# Patient Record
Sex: MALE | Race: WHITE | NOT HISPANIC OR LATINO
[De-identification: names, ages, dates, MRNs, and addresses within clinical notes are randomized per-mention and may not be internally consistent; named-entity substitution may affect disease eponyms.]

---

## 2017-06-13 ENCOUNTER — APPOINTMENT (OUTPATIENT)
Dept: CARDIOLOGY | Facility: CLINIC | Age: 57
End: 2017-06-13

## 2017-06-13 DIAGNOSIS — Z78.9 OTHER SPECIFIED HEALTH STATUS: ICD-10-CM

## 2017-06-13 DIAGNOSIS — Z87.891 PERSONAL HISTORY OF NICOTINE DEPENDENCE: ICD-10-CM

## 2017-06-13 DIAGNOSIS — Z98.890 OTHER SPECIFIED POSTPROCEDURAL STATES: ICD-10-CM

## 2017-10-30 PROBLEM — Z98.890 H/O CARDIAC CATHETERIZATION: Status: RESOLVED | Noted: 2017-10-30 | Resolved: 2017-10-30

## 2017-10-30 PROBLEM — Z87.891 FORMER SMOKER: Status: ACTIVE | Noted: 2017-10-30

## 2017-10-30 PROBLEM — Z78.9 DRINKS BEER: Status: ACTIVE | Noted: 2017-10-30

## 2017-10-30 RX ORDER — ASPIRIN 81 MG
81 TABLET, DELAYED RELEASE (ENTERIC COATED) ORAL DAILY
Refills: 0 | Status: ACTIVE | COMMUNITY

## 2017-11-28 ENCOUNTER — MEDICATION RENEWAL (OUTPATIENT)
Age: 57
End: 2017-11-28

## 2018-01-02 ENCOUNTER — APPOINTMENT (OUTPATIENT)
Dept: CARDIOLOGY | Facility: CLINIC | Age: 58
End: 2018-01-02
Payer: COMMERCIAL

## 2018-01-02 ENCOUNTER — NON-APPOINTMENT (OUTPATIENT)
Age: 58
End: 2018-01-02

## 2018-01-02 VITALS
HEART RATE: 82 BPM | DIASTOLIC BLOOD PRESSURE: 72 MMHG | WEIGHT: 235 LBS | BODY MASS INDEX: 31.14 KG/M2 | HEIGHT: 73 IN | SYSTOLIC BLOOD PRESSURE: 150 MMHG | OXYGEN SATURATION: 94 %

## 2018-01-02 PROCEDURE — 99214 OFFICE O/P EST MOD 30 MIN: CPT

## 2018-01-22 ENCOUNTER — APPOINTMENT (OUTPATIENT)
Dept: CARDIOLOGY | Facility: CLINIC | Age: 58
End: 2018-01-22
Payer: COMMERCIAL

## 2018-01-22 PROCEDURE — 93880 EXTRACRANIAL BILAT STUDY: CPT

## 2018-01-22 PROCEDURE — 93306 TTE W/DOPPLER COMPLETE: CPT

## 2018-01-22 PROCEDURE — 93979 VASCULAR STUDY: CPT

## 2018-01-25 ENCOUNTER — APPOINTMENT (OUTPATIENT)
Dept: CARDIOLOGY | Facility: CLINIC | Age: 58
End: 2018-01-25

## 2018-02-28 ENCOUNTER — MEDICATION RENEWAL (OUTPATIENT)
Age: 58
End: 2018-02-28

## 2018-06-25 ENCOUNTER — RX RENEWAL (OUTPATIENT)
Age: 58
End: 2018-06-25

## 2018-07-09 ENCOUNTER — RX RENEWAL (OUTPATIENT)
Age: 58
End: 2018-07-09

## 2018-07-10 ENCOUNTER — APPOINTMENT (OUTPATIENT)
Dept: CARDIOLOGY | Facility: CLINIC | Age: 58
End: 2018-07-10
Payer: COMMERCIAL

## 2018-07-10 VITALS
WEIGHT: 236 LBS | BODY MASS INDEX: 31.28 KG/M2 | HEART RATE: 70 BPM | SYSTOLIC BLOOD PRESSURE: 136 MMHG | HEIGHT: 73 IN | DIASTOLIC BLOOD PRESSURE: 74 MMHG | OXYGEN SATURATION: 95 %

## 2018-07-10 PROCEDURE — 99214 OFFICE O/P EST MOD 30 MIN: CPT

## 2018-07-31 ENCOUNTER — RX RENEWAL (OUTPATIENT)
Age: 58
End: 2018-07-31

## 2018-07-31 ENCOUNTER — MEDICATION RENEWAL (OUTPATIENT)
Age: 58
End: 2018-07-31

## 2018-10-24 ENCOUNTER — MEDICATION RENEWAL (OUTPATIENT)
Age: 58
End: 2018-10-24

## 2019-01-10 ENCOUNTER — NON-APPOINTMENT (OUTPATIENT)
Age: 59
End: 2019-01-10

## 2019-01-10 ENCOUNTER — APPOINTMENT (OUTPATIENT)
Dept: CARDIOLOGY | Facility: CLINIC | Age: 59
End: 2019-01-10
Payer: COMMERCIAL

## 2019-01-10 VITALS
HEIGHT: 73 IN | WEIGHT: 238 LBS | SYSTOLIC BLOOD PRESSURE: 134 MMHG | OXYGEN SATURATION: 95 % | HEART RATE: 73 BPM | BODY MASS INDEX: 31.54 KG/M2 | DIASTOLIC BLOOD PRESSURE: 80 MMHG

## 2019-01-10 PROCEDURE — 93000 ELECTROCARDIOGRAM COMPLETE: CPT

## 2019-01-10 PROCEDURE — 99214 OFFICE O/P EST MOD 30 MIN: CPT

## 2019-01-10 NOTE — DISCUSSION/SUMMARY
[FreeTextEntry1] : Terrence is a 58-year-old male  with medical history detailed above and active medical issues including:\par \par -Stable angina, normal stress echo. normal LVEF March 2016\par \par -Coronary artery disease, unstable angina, non-Q MI, drug-eluting stent of the LAD in December 2013, stable on aspirin, Plavix, and Toprol, normalized LV systolic function and wall motion. Patient wishes to continue long-term aspirin and Plavix therapy. \par \par -Slight increase in aortic root dimensions on most recent echocardiogram. Patient reminded to avoid heavy weight lifting. \par \par  -Brief hemoptysis resolved, prior smoker, declines pulmonology follow-up \par \par -Dyslipidemia, on high intensity statin therapy, stable.\par \par -Tobacco addiction, quit in December 2013, and again 1/9/16 after hemoptysis admission [facility].\par \par Advised patient to follow active lifestyle with regular cardiovascular exercise. Patient educated on lifestyle and diet modification with low sodium low fat diet and avoidance of excessive alcohol. Patient is aware to call with any symptoms or concerns. \par \par Cardiology followup 6 months.Patient will have 2-D echocardiogram to assess for  LV systolic function, wall motion and  structural heart disease. Carotid and abdominal ultrasound to assess for obstructive PAD. \par \par  Current cardiac medications remain unchanged. Repeat labs will be ordered with PMD.\par \par Umer will follow up with Dr. Katie Barriga for primary care.\par

## 2019-01-10 NOTE — PHYSICAL EXAM
[General Appearance - Well Developed] : well developed [Normal Appearance] : normal appearance [Well Groomed] : well groomed [General Appearance - Well Nourished] : well nourished [No Deformities] : no deformities [General Appearance - In No Acute Distress] : no acute distress [Normal Jugular Venous A Waves Present] : normal jugular venous A waves present [Normal Jugular Venous V Waves Present] : normal jugular venous V waves present [No Jugular Venous Downing A Waves] : no jugular venous downing A waves [Respiration, Rhythm And Depth] : normal respiratory rhythm and effort [Exaggerated Use Of Accessory Muscles For Inspiration] : no accessory muscle use [Auscultation Breath Sounds / Voice Sounds] : lungs were clear to auscultation bilaterally [Abdomen Soft] : soft [Abdomen Tenderness] : non-tender [Abdomen Mass (___ Cm)] : no abdominal mass palpated [Abnormal Walk] : normal gait [Gait - Sufficient For Exercise Testing] : the gait was sufficient for exercise testing [Nail Clubbing] : no clubbing of the fingernails [Cyanosis, Localized] : no localized cyanosis [Petechial Hemorrhages (___cm)] : no petechial hemorrhages [Skin Color & Pigmentation] : normal skin color and pigmentation [] : no rash [No Venous Stasis] : no venous stasis [Skin Lesions] : no skin lesions [No Skin Ulcers] : no skin ulcer [No Xanthoma] : no  xanthoma was observed [Oriented To Time, Place, And Person] : oriented to person, place, and time [Affect] : the affect was normal [Mood] : the mood was normal [No Anxiety] : not feeling anxious

## 2019-01-10 NOTE — REASON FOR VISIT
[Follow-Up - Clinic] : a clinic follow-up of [FreeTextEntry2] : HTN, dyslipidemia, CAD, NSTEMI, RENETTA LAD Dec 2013 [FreeTextEntry1] : Terrence is a 58-year-old male with a history of dyslipidemia, hypertension, recent unstable angina, NSTEMI,  RENETTA LAD in December 2013, mild cardiomyopathy with normalization of LV, prior tobacco, quit in December 2013 and Jan 2016.\par \par Cardiovascular review of symptoms is negative for exertional chest pain, dyspnea, palpitations, dizziness or syncope. No PND or orthopnea leg edema. No bleeding or black stool.\par \par Patient is physically active walking more than 20 minutes without exertional chest pain.  Patient is clamming in the chest deep water wearing a wet suit several hours a day during the winter. Patient declines performing a current stress test. \par \par Terrence works out at the gym three times per week. He is working as a  at [employer]. \par \par Patient had admission to [facility] January 9, 2016 for coughing and hemoptysis.  Patient states that he had been violently coughing productive yellow sputum and subsequent small amount of blood.  He became quite anxious that he may have "lung cancer".  He was admitted to [facility] evaluated by pulmonologist Dr. Mitchell.  CT of the chest revealing scattered ground glass hazy nodular-type infiltrates probable left upper lobe infiltrate.  Study was not conclusive to rule out PE and VQ scan was performed.  Results not available to me, patient states negative for PE. Patient was treated for pneumonia.  Video imaging of the oropharynx was discussed patient declined.  \par \par Patient was discharged on oral antibiotic therapy.  He stopped smoking and vaping since discharge.  He restarted the Plavix no further hemoptysis  Drug-eluting stent LAD was placed December 2013.   Discussed risk and options of long term Plavix and he wishes to continue.  \par \par 2-D echo January 2018 LV of 60%, normal diastolic function, normal Doppler, aortic root 4.5cm,  ascending aorta 4.2cm. \par \par Carotid and abdominal ultrasound January 2018 unobstructive, normal abdominal aortic size.\par \par Stress echo March 2016 LVEF 60% normal in size wall motion no ischemia, equivocal EKG response, no anginal symptoms, hypertensive blood pressures response, 80% MPHR 9 minutes Huseyin protocol\par \par Echocardiogram 3/17/16. EF: 55%, mild LAE and LV E. No LVH. Normal systolic function. Mild diastolic dysfunction. Mildly dilated aortic root. Aortic arch measures 2.8 cm, ascending aorta measures 4.3 cm. Trace to mild valvulopathy.\par \par Carotid ultrasound 3/17/16. Bilateral intimal thickening. No change noted.\par \par Abdominal ultrasound 3/17/16. No abdominal aortic aneurysm noted. No prominent or mobile plaque noted. Stress echocardiogram 3/24/16. Completed nine minutes and nine seconds of Huseyin protocol achieving 88% of MPHR. Peak blood pressure was 194/100 mmHg off of his Toprol. Equivocal for ischemia by EKG. Normal augmentation of left ventricular function wall motion. Negative for exercise-induced myocardial ischemia.\par

## 2019-01-23 ENCOUNTER — MEDICATION RENEWAL (OUTPATIENT)
Age: 59
End: 2019-01-23

## 2019-02-25 ENCOUNTER — RX RENEWAL (OUTPATIENT)
Age: 59
End: 2019-02-25

## 2019-04-25 ENCOUNTER — MEDICATION RENEWAL (OUTPATIENT)
Age: 59
End: 2019-04-25

## 2019-04-25 ENCOUNTER — RX RENEWAL (OUTPATIENT)
Age: 59
End: 2019-04-25

## 2019-04-30 ENCOUNTER — RX RENEWAL (OUTPATIENT)
Age: 59
End: 2019-04-30

## 2019-07-01 ENCOUNTER — RX RENEWAL (OUTPATIENT)
Age: 59
End: 2019-07-01

## 2019-07-09 ENCOUNTER — RX RENEWAL (OUTPATIENT)
Age: 59
End: 2019-07-09

## 2019-07-23 ENCOUNTER — APPOINTMENT (OUTPATIENT)
Dept: CARDIOLOGY | Facility: CLINIC | Age: 59
End: 2019-07-23
Payer: COMMERCIAL

## 2019-07-23 VITALS
HEART RATE: 75 BPM | SYSTOLIC BLOOD PRESSURE: 130 MMHG | BODY MASS INDEX: 31.28 KG/M2 | WEIGHT: 236 LBS | DIASTOLIC BLOOD PRESSURE: 72 MMHG | OXYGEN SATURATION: 95 % | HEIGHT: 73 IN

## 2019-07-23 PROCEDURE — 99214 OFFICE O/P EST MOD 30 MIN: CPT

## 2019-07-23 NOTE — PHYSICAL EXAM
[Normal Appearance] : normal appearance [Well Groomed] : well groomed [General Appearance - Well Developed] : well developed [General Appearance - Well Nourished] : well nourished [No Deformities] : no deformities [Normal Jugular Venous A Waves Present] : normal jugular venous A waves present [General Appearance - In No Acute Distress] : no acute distress [Normal Jugular Venous V Waves Present] : normal jugular venous V waves present [No Jugular Venous Downing A Waves] : no jugular venous downing A waves [Exaggerated Use Of Accessory Muscles For Inspiration] : no accessory muscle use [Respiration, Rhythm And Depth] : normal respiratory rhythm and effort [Auscultation Breath Sounds / Voice Sounds] : lungs were clear to auscultation bilaterally [Abdomen Soft] : soft [Abdomen Tenderness] : non-tender [Abdomen Mass (___ Cm)] : no abdominal mass palpated [Abnormal Walk] : normal gait [Gait - Sufficient For Exercise Testing] : the gait was sufficient for exercise testing [Cyanosis, Localized] : no localized cyanosis [Nail Clubbing] : no clubbing of the fingernails [Petechial Hemorrhages (___cm)] : no petechial hemorrhages [Skin Color & Pigmentation] : normal skin color and pigmentation [No Venous Stasis] : no venous stasis [] : no rash [No Xanthoma] : no  xanthoma was observed [No Skin Ulcers] : no skin ulcer [Skin Lesions] : no skin lesions [Oriented To Time, Place, And Person] : oriented to person, place, and time [Affect] : the affect was normal [Mood] : the mood was normal [No Anxiety] : not feeling anxious

## 2020-01-23 ENCOUNTER — APPOINTMENT (OUTPATIENT)
Dept: CARDIOLOGY | Facility: CLINIC | Age: 60
End: 2020-01-23
Payer: COMMERCIAL

## 2020-01-23 ENCOUNTER — NON-APPOINTMENT (OUTPATIENT)
Age: 60
End: 2020-01-23

## 2020-01-23 VITALS
BODY MASS INDEX: 31.28 KG/M2 | WEIGHT: 236 LBS | HEART RATE: 77 BPM | OXYGEN SATURATION: 95 % | HEIGHT: 73 IN | DIASTOLIC BLOOD PRESSURE: 80 MMHG | SYSTOLIC BLOOD PRESSURE: 130 MMHG

## 2020-01-23 PROCEDURE — 93000 ELECTROCARDIOGRAM COMPLETE: CPT

## 2020-01-23 PROCEDURE — 99214 OFFICE O/P EST MOD 30 MIN: CPT

## 2020-01-23 NOTE — DISCUSSION/SUMMARY
[FreeTextEntry1] : Terrence is a 59 year-old male  with medical history detailed above and active medical issues including:\par \par -Stable angina, normal stress echo. normal LVEF March 2016. Patient declines performing a current stress test, will discuss next office visit.\par \par -Coronary artery disease, unstable angina, non-Q MI, drug-eluting stent of the LAD in December 2013, stable on aspirin, Plavix, and Toprol, normalized LV systolic function and wall motion. Patient wishes to continue long-term aspirin and Plavix therapy. \par \par - Ascending aortic aneurysm 4.2cm unchanged,  echo January 2018. Patient reminded to avoid heavy weight lifting. \par \par  -Brief hemoptysis resolved, prior smoker, declines pulmonology follow-up \par \par -Dyslipidemia, on high intensity statin therapy, stable.\par \par -Tobacco addiction, quit in December 2013, and again 1/9/16 after hemoptysis admission [facility].\par \par Advised patient to follow active lifestyle with regular cardiovascular exercise. Patient educated on lifestyle and diet modification with low sodium low fat diet and avoidance of excessive alcohol. Patient is aware to call with any symptoms or concerns. \par \par Cardiology followup 6 months.Patient will have 2-D echocardiogram to assess for  LV systolic function, wall motion and  structural heart disease.  Carotid and abdominal ultrasound to assess for obstructive PAD.\par \par Current cardiac medications remain unchanged. Repeat labs will be ordered with PMD.\par \par Umer will follow up with Dr. Katie Barriga for primary care.\par

## 2020-01-23 NOTE — REASON FOR VISIT
[Follow-Up - Clinic] : a clinic follow-up of [FreeTextEntry1] : Terrence is a 59-year-old male with a history of dyslipidemia, hypertension,  unstable angina, NSTEMI,  RENETTA LAD in December 2013, mild cardiomyopathy with normalization of LV, prior tobacco, quit in December 2013 and Jan 2016.\par \par Cardiovascular review of symptoms is negative for exertional chest pain, dyspnea, palpitations, dizziness or syncope. No PND or orthopnea leg edema. No bleeding or black stool.\par \par Patient is physically active walking more than 20 minutes without exertional chest pain.  Patient is clamming in the chest deep water wearing a wet suit several hours a day during the winter. Patient declines performing a current stress test. \par \par Terrence works out at the gym three times per week. He is working as a  at [employer]. \par \par Patient had admission to [facility] January 9, 2016 for coughing and hemoptysis.  Patient states that he had been violently coughing productive yellow sputum and subsequent small amount of blood.  He became quite anxious that he may have "lung cancer".  He was admitted to [facility] evaluated by pulmonologist Dr. Mitchell.  CT of the chest revealing scattered ground glass hazy nodular-type infiltrates probable left upper lobe infiltrate.  Study was not conclusive to rule out PE and VQ scan was performed.  Results not available to me, patient states negative for PE. Patient was treated for pneumonia.  Video imaging of the oropharynx was discussed patient declined.  \par \par Patient was discharged on oral antibiotic therapy.  He stopped smoking and vaping since discharge.  He restarted the Plavix no further hemoptysis  Drug-eluting stent LAD was placed December 2013.   Discussed risk and options of long term Plavix and he wishes to continue.  \par \par 2-D echo January 2018 LV of 60%, normal diastolic function, normal Doppler, aortic root 4.5cm,  ascending aorta 4.2cm. \par \par Carotid and abdominal ultrasound January 2018 unobstructive, normal abdominal aortic size.\par \par Stress echo March 2016 LVEF 60% normal in size wall motion no ischemia, equivocal EKG response, no anginal symptoms, hypertensive blood pressures response, 80% MPHR 9 minutes Huseyin protocol\par \par Echocardiogram 3/17/16. EF: 55%, mild LAE and LV E. No LVH. Normal systolic function. Mild diastolic dysfunction. Mildly dilated aortic root. Aortic arch measures 2.8 cm, ascending aorta measures 4.3 cm. Trace to mild valvulopathy.\par \par Carotid ultrasound 3/17/16. Bilateral intimal thickening. No change noted.\par \par Abdominal ultrasound 3/17/16. No abdominal aortic aneurysm noted. No prominent or mobile plaque noted. Stress echocardiogram 3/24/16. Completed nine minutes and nine seconds of Huseyin protocol achieving 88% of MPHR. Peak blood pressure was 194/100 mmHg off of his Toprol. Equivocal for ischemia by EKG. Normal augmentation of left ventricular function wall motion. Negative for exercise-induced myocardial ischemia.\par  [FreeTextEntry2] : HTN, dyslipidemia, CAD, NSTEMI, RENETTA LAD Dec 2013

## 2020-02-03 ENCOUNTER — APPOINTMENT (OUTPATIENT)
Dept: CARDIOLOGY | Facility: CLINIC | Age: 60
End: 2020-02-03
Payer: COMMERCIAL

## 2020-02-03 PROCEDURE — 93880 EXTRACRANIAL BILAT STUDY: CPT

## 2020-02-03 PROCEDURE — 93306 TTE W/DOPPLER COMPLETE: CPT

## 2020-02-03 PROCEDURE — 93979 VASCULAR STUDY: CPT

## 2020-09-16 ENCOUNTER — RX RENEWAL (OUTPATIENT)
Age: 60
End: 2020-09-16

## 2020-09-30 ENCOUNTER — APPOINTMENT (OUTPATIENT)
Dept: CARDIOLOGY | Facility: CLINIC | Age: 60
End: 2020-09-30
Payer: COMMERCIAL

## 2020-09-30 VITALS
OXYGEN SATURATION: 98 % | HEART RATE: 73 BPM | DIASTOLIC BLOOD PRESSURE: 76 MMHG | BODY MASS INDEX: 31.41 KG/M2 | TEMPERATURE: 97.5 F | SYSTOLIC BLOOD PRESSURE: 124 MMHG | WEIGHT: 237 LBS | HEIGHT: 73 IN

## 2020-09-30 PROCEDURE — 99214 OFFICE O/P EST MOD 30 MIN: CPT

## 2020-09-30 NOTE — REASON FOR VISIT
[Follow-Up - Clinic] : a clinic follow-up of [FreeTextEntry2] : HTN, dyslipidemia, CAD, NSTEMI, RENETTA LAD Dec 2013 [FreeTextEntry1] : Terrence is a 60-year-old male with a history of dyslipidemia, hypertension,  unstable angina, NSTEMI,  RENETTA LAD in December 2013, mild cardiomyopathy with normalization of LV, prior tobacco, quit in December 2013 and Jan 2016.\par \par Cardiovascular review of symptoms is negative for exertional chest pain, dyspnea, palpitations, dizziness or syncope. No PND or orthopnea leg edema. No bleeding or black stool.\par \par Patient is physically active walking more than 20 minutes without exertional chest pain.  Patient is clamming in the chest deep water wearing a wet suit several hours a day during the winter. Patient declines performing a current stress test. \par \par Terrence works out at the gym three times per week. He is working as a  at [employer]. \par \par Patient had admission to [facility] January 9, 2016 for coughing and hemoptysis.  Patient states that he had been violently coughing productive yellow sputum and subsequent small amount of blood.  He became quite anxious that he may have "lung cancer".  He was admitted to [facility] evaluated by pulmonologist Dr. Mitchell.  CT of the chest revealing scattered ground glass hazy nodular-type infiltrates probable left upper lobe infiltrate.  Study was not conclusive to rule out PE and VQ scan was performed.  Results not available to me, patient states negative for PE. Patient was treated for pneumonia.  Video imaging of the oropharynx was discussed patient declined.  \par \par Patient was discharged on oral antibiotic therapy.  He stopped smoking and vaping since discharge.  He restarted the Plavix no further hemoptysis  Drug-eluting stent LAD was placed December 2013.   Discussed risk and options of long term Plavix and he wishes to continue.  \par \par 2-D echo January 2018 LV of 60%, normal diastolic function, normal Doppler, aortic root 4.5cm,  ascending aorta 4.2cm. \par \par Carotid and abdominal ultrasound January 2018 unobstructive, normal abdominal aortic size.\par \par Stress echo March 2016 LVEF 60% normal in size wall motion no ischemia, equivocal EKG response, no anginal symptoms, hypertensive blood pressures response, 80% MPHR 9 minutes Huseyin protocol\par \par Echocardiogram 3/17/16. EF: 55%, mild LAE and LV E. No LVH. Normal systolic function. Mild diastolic dysfunction. Mildly dilated aortic root. Aortic arch measures 2.8 cm, ascending aorta measures 4.3 cm. Trace to mild valvulopathy.\par \par Carotid ultrasound 3/17/16. Bilateral intimal thickening. No change noted.\par \par Abdominal ultrasound 3/17/16. No abdominal aortic aneurysm noted. No prominent or mobile plaque noted. Stress echocardiogram 3/24/16. Completed nine minutes and nine seconds of Huseyin protocol achieving 88% of MPHR. Peak blood pressure was 194/100 mmHg off of his Toprol. Equivocal for ischemia by EKG. Normal augmentation of left ventricular function wall motion. Negative for exercise-induced myocardial ischemia.\par

## 2020-09-30 NOTE — DISCUSSION/SUMMARY
[FreeTextEntry1] : Terrence is a 59 year-old male  with medical history detailed above and active medical issues including:\par \par -Stable angina, normal stress echo. normal LVEF March 2016. Patient declines performing a current stress test in past.  In the setting of Covid 19 pandemic we will discuss the need for stress testing next office visit.\par \par - Coronary artery disease, unstable angina, non-Q MI, drug-eluting stent of the LAD in December 2013, stable on aspirin, Plavix, and Toprol, normalized LV systolic function and wall motion. Patient wishes to continue long-term aspirin and Plavix therapy. \par \par - Ascending aortic aneurysm 4.2cm unchanged,  echo January 2018. Patient reminded to avoid heavy weight lifting. \par \par - Brief hemoptysis resolved, prior smoker, declines pulmonology follow-up \par \par - Dyslipidemia, on high intensity statin therapy, stable.\par \par - Tobacco addiction, quit in December 2013, and again 1/9/16 after hemoptysis admission [facility].\par \par Advised patient to follow active lifestyle with regular cardiovascular exercise. Patient educated on lifestyle and diet modification with low sodium low fat diet and avoidance of excessive alcohol. Patient is aware to call with any symptoms or concerns. \par \par Cardiology followup 6 months.Patient will have 2-D echocardiogram to assess for  LV systolic function, wall motion and  structural heart disease. \par \par Current cardiac medications remain unchanged. Repeat labs will be ordered with PMD.\par \par Umer will follow up with Dr. Katie Barriga for primary care.\par

## 2020-10-28 ENCOUNTER — NON-APPOINTMENT (OUTPATIENT)
Age: 60
End: 2020-10-28

## 2020-12-03 DIAGNOSIS — Z00.00 ENCOUNTER FOR GENERAL ADULT MEDICAL EXAMINATION W/OUT ABNORMAL FINDINGS: ICD-10-CM

## 2021-01-20 NOTE — REASON FOR VISIT
Impression: Presence of intraocular lens: Z96.1.  Plan: Observe [Follow-Up - Clinic] : a clinic follow-up of [FreeTextEntry1] : Terrence is a 59-year-old male with a history of dyslipidemia, hypertension, recent unstable angina, NSTEMI,  RENETTA LAD in December 2013, mild cardiomyopathy with normalization of LV, prior tobacco, quit in December 2013 and Jan 2016.\par \par Cardiovascular review of symptoms is negative for exertional chest pain, dyspnea, palpitations, dizziness or syncope. No PND or orthopnea leg edema. No bleeding or black stool.\par \par Patient is physically active walking more than 20 minutes without exertional chest pain.  Patient is clamming in the chest deep water wearing a wet suit several hours a day during the winter. Patient declines performing a current stress test. \par \par Terrence works out at the gym three times per week. He is working as a  at [employer]. \par \par Patient had admission to [facility] January 9, 2016 for coughing and hemoptysis.  Patient states that he had been violently coughing productive yellow sputum and subsequent small amount of blood.  He became quite anxious that he may have "lung cancer".  He was admitted to [facility] evaluated by pulmonologist Dr. Mitchell.  CT of the chest revealing scattered ground glass hazy nodular-type infiltrates probable left upper lobe infiltrate.  Study was not conclusive to rule out PE and VQ scan was performed.  Results not available to me, patient states negative for PE. Patient was treated for pneumonia.  Video imaging of the oropharynx was discussed patient declined.  \par \par Patient was discharged on oral antibiotic therapy.  He stopped smoking and vaping since discharge.  He restarted the Plavix no further hemoptysis  Drug-eluting stent LAD was placed December 2013.   Discussed risk and options of long term Plavix and he wishes to continue.  \par \par 2-D echo January 2018 LV of 60%, normal diastolic function, normal Doppler, aortic root 4.5cm,  ascending aorta 4.2cm. \par \par Carotid and abdominal ultrasound January 2018 unobstructive, normal abdominal aortic size.\par \par Stress echo March 2016 LVEF 60% normal in size wall motion no ischemia, equivocal EKG response, no anginal symptoms, hypertensive blood pressures response, 80% MPHR 9 minutes Huseyin protocol\par \par Echocardiogram 3/17/16. EF: 55%, mild LAE and LV E. No LVH. Normal systolic function. Mild diastolic dysfunction. Mildly dilated aortic root. Aortic arch measures 2.8 cm, ascending aorta measures 4.3 cm. Trace to mild valvulopathy.\par \par Carotid ultrasound 3/17/16. Bilateral intimal thickening. No change noted.\par \par Abdominal ultrasound 3/17/16. No abdominal aortic aneurysm noted. No prominent or mobile plaque noted. Stress echocardiogram 3/24/16. Completed nine minutes and nine seconds of Huseyin protocol achieving 88% of MPHR. Peak blood pressure was 194/100 mmHg off of his Toprol. Equivocal for ischemia by EKG. Normal augmentation of left ventricular function wall motion. Negative for exercise-induced myocardial ischemia.\par  [FreeTextEntry2] : HTN, dyslipidemia, CAD, NSTEMI, RENETTA LAD Dec 2013

## 2021-03-16 ENCOUNTER — APPOINTMENT (OUTPATIENT)
Dept: CARDIOLOGY | Facility: CLINIC | Age: 61
End: 2021-03-16
Payer: COMMERCIAL

## 2021-03-16 PROCEDURE — 99072 ADDL SUPL MATRL&STAF TM PHE: CPT

## 2021-03-16 PROCEDURE — 93306 TTE W/DOPPLER COMPLETE: CPT

## 2021-04-27 ENCOUNTER — NON-APPOINTMENT (OUTPATIENT)
Age: 61
End: 2021-04-27

## 2021-04-27 ENCOUNTER — APPOINTMENT (OUTPATIENT)
Dept: CARDIOLOGY | Facility: CLINIC | Age: 61
End: 2021-04-27
Payer: COMMERCIAL

## 2021-04-27 VITALS
OXYGEN SATURATION: 97 % | WEIGHT: 235 LBS | HEIGHT: 73 IN | HEART RATE: 79 BPM | SYSTOLIC BLOOD PRESSURE: 130 MMHG | TEMPERATURE: 98.2 F | DIASTOLIC BLOOD PRESSURE: 70 MMHG | BODY MASS INDEX: 31.14 KG/M2

## 2021-04-27 PROCEDURE — 99072 ADDL SUPL MATRL&STAF TM PHE: CPT

## 2021-04-27 PROCEDURE — 93000 ELECTROCARDIOGRAM COMPLETE: CPT

## 2021-04-27 PROCEDURE — 99214 OFFICE O/P EST MOD 30 MIN: CPT

## 2021-04-27 NOTE — PHYSICAL EXAM
[General Appearance - Well Developed] : well developed [Normal Appearance] : normal appearance [Well Groomed] : well groomed [General Appearance - Well Nourished] : well nourished [No Deformities] : no deformities [General Appearance - In No Acute Distress] : no acute distress [Normal Jugular Venous A Waves Present] : normal jugular venous A waves present [Normal Jugular Venous V Waves Present] : normal jugular venous V waves present [No Jugular Venous Downing A Waves] : no jugular venous downing A waves [Respiration, Rhythm And Depth] : normal respiratory rhythm and effort [Exaggerated Use Of Accessory Muscles For Inspiration] : no accessory muscle use [Auscultation Breath Sounds / Voice Sounds] : lungs were clear to auscultation bilaterally [Abdomen Tenderness] : non-tender [Abdomen Soft] : soft [Abdomen Mass (___ Cm)] : no abdominal mass palpated [Abnormal Walk] : normal gait [Gait - Sufficient For Exercise Testing] : the gait was sufficient for exercise testing [Nail Clubbing] : no clubbing of the fingernails [Cyanosis, Localized] : no localized cyanosis [Petechial Hemorrhages (___cm)] : no petechial hemorrhages [Skin Color & Pigmentation] : normal skin color and pigmentation [] : no rash [No Venous Stasis] : no venous stasis [Skin Lesions] : no skin lesions [No Skin Ulcers] : no skin ulcer [No Xanthoma] : no  xanthoma was observed [Oriented To Time, Place, And Person] : oriented to person, place, and time [Affect] : the affect was normal [Mood] : the mood was normal [No Anxiety] : not feeling anxious

## 2021-04-27 NOTE — DISCUSSION/SUMMARY
[FreeTextEntry1] : Terrence is a 61 year-old male  with medical history detailed above and active medical issues including:\par \par - Patient has dyspnea with moderate exertion.  Stable angina, normal stress echo. normal LVEF March 2016.  Patient will have noninvasive testing with a exercise Myoview stress test to assess for obstructive CAD, exercise-induced arrhythmia,  blood pressure response.\par \par - Coronary artery disease, unstable angina, non-Q MI, drug-eluting stent of the LAD in December 2013, stable on aspirin, Plavix, and Toprol, normalized LV systolic function and wall motion. Patient wishes to continue long-term aspirin and Plavix therapy. \par \par - Ascending aortic aneurysm 4.2cm unchanged,  echo January 2018. Patient reminded to avoid heavy weight lifting. \par \par - Brief hemoptysis resolved, prior smoker, declines pulmonology follow-up \par \par - Dyslipidemia, on high intensity statin therapy, stable.\par \par - Tobacco addiction, quit in December 2013, and again 1/9/16 after hemoptysis admission [facility].\par \par Advised patient to follow active lifestyle with regular cardiovascular exercise. Patient educated on lifestyle and diet modification with low sodium low fat diet and avoidance of excessive alcohol. Patient is aware to call with any symptoms or concerns. \par \par Patient will be seen in cardiology follow-up after noninvasive testing. \par \par Current cardiac medications remain unchanged. Repeat labs will be ordered with PMD.\par \par Umer will follow up with Dr. Katie Barriga for primary care.\par

## 2021-04-27 NOTE — REASON FOR VISIT
[Follow-Up - Clinic] : a clinic follow-up of [FreeTextEntry1] : Terrence is a 61-year-old male with a history of dyslipidemia, hypertension,  angina, NSTEMI,  RENETTA LAD Dec 2013, mild cardiomyopathy with normalization of LVEF 60% echo March 2021, prior tobacco, quit in December 2013 and Jan 2016.\par \par Patient has dyspnea with moderate exertion.  Cardiovascular review of symptoms is negative for exertional chest pain, palpitations, dizziness or syncope. No PND or orthopnea leg edema. No bleeding or black stool.\par \par Patient is physically active walking more than 20 minutes without exertional chest pain.  Patient is clamming in the chest deep water wearing a wet suit several hours a day during the winter. Patient's brother had sudden death with prior DVT after horse kicked his leg.\par \par Patient no longer working out in the gym in the setting of Covid panndemic. He is working as a  at [employer]. \par \par Patient had admission to [facility] January 9, 2016 for coughing and hemoptysis.  Patient states that he had been violently coughing productive yellow sputum and subsequent small amount of blood.  He became quite anxious that he may have "lung cancer".  He was admitted to [facility] evaluated by pulmonologist Dr. Mitchell.  CT of the chest revealing scattered ground glass hazy nodular-type infiltrates probable left upper lobe infiltrate.  Study was not conclusive to rule out PE and VQ scan was performed.  Results not available to me, patient states negative for PE. Patient was treated for pneumonia.  Video imaging of the oropharynx was discussed patient declined.  \par \par Patient was discharged on oral antibiotic therapy.  He stopped smoking and vaping since discharge.  He restarted the Plavix no further hemoptysis  Drug-eluting stent LAD was placed December 2013.   Discussed risk and options of long term Plavix and he wishes to continue.  \par \par 2-D echo January 2018 LV of 60%, normal diastolic function, normal Doppler, aortic root 4.5cm,  ascending aorta 4.2cm. \par \par Carotid and abdominal ultrasound January 2018 unobstructive, normal abdominal aortic size.\par \par Stress echo March 2016 LVEF 60% normal in size wall motion no ischemia, equivocal EKG response, no anginal symptoms, hypertensive blood pressures response, 80% MPHR 9 minutes Huseyin protocol\par \par Echocardiogram 3/17/16. EF: 55%, mild LAE and LV E. No LVH. Normal systolic function. Mild diastolic dysfunction. Mildly dilated aortic root. Aortic arch measures 2.8 cm, ascending aorta measures 4.3 cm. Trace to mild valvulopathy.\par \par Carotid ultrasound 3/17/16. Bilateral intimal thickening. No change noted.\par \par Abdominal ultrasound 3/17/16. No abdominal aortic aneurysm noted. No prominent or mobile plaque noted. Stress echocardiogram 3/24/16. Completed nine minutes and nine seconds of Huseyin protocol achieving 88% of MPHR. Peak blood pressure was 194/100 mmHg off of his Toprol. Equivocal for ischemia by EKG. Normal augmentation of left ventricular function wall motion. Negative for exercise-induced myocardial ischemia.\par  [FreeTextEntry2] : HTN, dyslipidemia, CAD, NSTEMI, RENETTA LAD Dec 2013

## 2021-06-01 ENCOUNTER — APPOINTMENT (OUTPATIENT)
Dept: CARDIOLOGY | Facility: CLINIC | Age: 61
End: 2021-06-01
Payer: COMMERCIAL

## 2021-06-01 PROCEDURE — 93015 CV STRESS TEST SUPVJ I&R: CPT

## 2021-06-01 PROCEDURE — 78452 HT MUSCLE IMAGE SPECT MULT: CPT

## 2021-06-01 PROCEDURE — A9502: CPT

## 2021-06-01 PROCEDURE — 99072 ADDL SUPL MATRL&STAF TM PHE: CPT

## 2021-06-06 NOTE — DISCUSSION/SUMMARY
[FreeTextEntry1] : Terrence is a 59 year-old male  with medical history detailed above and active medical issues including:\par \par -Stable angina, normal stress echo. normal LVEF March 2016. Patient declines performing a current stress test, will discuss next office visit.\par \par -Coronary artery disease, unstable angina, non-Q MI, drug-eluting stent of the LAD in December 2013, stable on aspirin, Plavix, and Toprol, normalized LV systolic function and wall motion. Patient wishes to continue long-term aspirin and Plavix therapy. \par \par - Ascending aortic aneurysm 4.2cm unchanged,  echo January 2018. Patient reminded to avoid heavy weight lifting. \par \par  -Brief hemoptysis resolved, prior smoker, declines pulmonology follow-up \par \par -Dyslipidemia, on high intensity statin therapy, stable.\par \par -Tobacco addiction, quit in December 2013, and again 1/9/16 after hemoptysis admission [facility].\par \par Advised patient to follow active lifestyle with regular cardiovascular exercise. Patient educated on lifestyle and diet modification with low sodium low fat diet and avoidance of excessive alcohol. Patient is aware to call with any symptoms or concerns. \par \par Cardiology followup 6 months.Patient will have 2-D echocardiogram to assess for  LV systolic function, wall motion and  structural heart disease.\par \par  Current cardiac medications remain unchanged. Repeat labs will be ordered with PMD.\par \par Umer will follow up with Dr. Katie Barriga for primary care.\par 
Two to four times a month

## 2021-07-27 ENCOUNTER — NON-APPOINTMENT (OUTPATIENT)
Age: 61
End: 2021-07-27

## 2021-07-27 ENCOUNTER — APPOINTMENT (OUTPATIENT)
Dept: CARDIOLOGY | Facility: CLINIC | Age: 61
End: 2021-07-27
Payer: COMMERCIAL

## 2021-07-27 VITALS
DIASTOLIC BLOOD PRESSURE: 70 MMHG | WEIGHT: 232 LBS | TEMPERATURE: 97.3 F | HEIGHT: 73 IN | HEART RATE: 78 BPM | BODY MASS INDEX: 30.75 KG/M2 | OXYGEN SATURATION: 95 % | SYSTOLIC BLOOD PRESSURE: 120 MMHG

## 2021-07-27 PROCEDURE — 99072 ADDL SUPL MATRL&STAF TM PHE: CPT

## 2021-07-27 PROCEDURE — 99214 OFFICE O/P EST MOD 30 MIN: CPT

## 2021-07-27 NOTE — PHYSICAL EXAM
[General Appearance - Well Developed] : well developed [Normal Appearance] : normal appearance [Well Groomed] : well groomed [General Appearance - Well Nourished] : well nourished [No Deformities] : no deformities [General Appearance - In No Acute Distress] : no acute distress [Normal Jugular Venous A Waves Present] : normal jugular venous A waves present [Normal Jugular Venous V Waves Present] : normal jugular venous V waves present [No Jugular Venous Downing A Waves] : no jugular venous downing A waves [Respiration, Rhythm And Depth] : normal respiratory rhythm and effort [Exaggerated Use Of Accessory Muscles For Inspiration] : no accessory muscle use [Auscultation Breath Sounds / Voice Sounds] : lungs were clear to auscultation bilaterally [Abdomen Soft] : soft [Abdomen Tenderness] : non-tender [Abdomen Mass (___ Cm)] : no abdominal mass palpated [Abnormal Walk] : normal gait [Gait - Sufficient For Exercise Testing] : the gait was sufficient for exercise testing [Nail Clubbing] : no clubbing of the fingernails [Cyanosis, Localized] : no localized cyanosis [Petechial Hemorrhages (___cm)] : no petechial hemorrhages [Skin Color & Pigmentation] : normal skin color and pigmentation [] : no rash [No Venous Stasis] : no venous stasis [Skin Lesions] : no skin lesions [No Skin Ulcers] : no skin ulcer [No Xanthoma] : no  xanthoma was observed [Oriented To Time, Place, And Person] : oriented to person, place, and time [Affect] : the affect was normal [Mood] : the mood was normal [No Anxiety] : not feeling anxious [Well Developed] : well developed [Well Nourished] : well nourished [No Acute Distress] : no acute distress [Normal Conjunctiva] : normal conjunctiva [Normal Venous Pressure] : normal venous pressure [No Carotid Bruit] : no carotid bruit [Normal S1, S2] : normal S1, S2 [No Rub] : no rub [No Gallop] : no gallop [Clear Lung Fields] : clear lung fields [Good Air Entry] : good air entry [No Respiratory Distress] : no respiratory distress  [Soft] : abdomen soft [Non Tender] : non-tender [No Masses/organomegaly] : no masses/organomegaly [Normal Bowel Sounds] : normal bowel sounds [Normal Gait] : normal gait [No Edema] : no edema [No Cyanosis] : no cyanosis [No Clubbing] : no clubbing [No Varicosities] : no varicosities [No Rash] : no rash [No Skin Lesions] : no skin lesions [Moves all extremities] : moves all extremities [No Focal Deficits] : no focal deficits [Normal Speech] : normal speech [Alert and Oriented] : alert and oriented [Normal memory] : normal memory [de-identified] : 2/6 KRISTY MR

## 2021-07-27 NOTE — REASON FOR VISIT
[Other: ____] : [unfilled] [FreeTextEntry1] : Terrence is a 61-year-old male with a history of dyslipidemia, hypertension,  angina, NSTEMI,  RENETTA LAD Dec 2013, mild cardiomyopathy with normalization of LVEF 60% echo March 2021, prior tobacco, quit in December 2013 and Jan 2016.\par \par Cardiovascular review of symptoms is negative for exertional chest pain, dyspnea, palpitations, dizziness or syncope. No PND or orthopnea leg edema. No bleeding or black stool.\par \par Patient is physically active walking more than 20 minutes without exertional chest pain.  Patient is clamming in the chest deep water wearing a wet suit several hours a day during the winter. Patient's brother had sudden death with prior DVT after horse kicked his leg.\par \par Patient no longer working out in the gym in the setting of Covid panndemic. He is working as a  at [employer]. \par \par Patient had admission to [facility] January 9, 2016 for coughing and hemoptysis.  Patient states that he had been violently coughing productive yellow sputum and subsequent small amount of blood.  He became quite anxious that he may have "lung cancer".  He was admitted to [facility] evaluated by pulmonologist Dr. Mitchell.  CT of the chest revealing scattered ground glass hazy nodular-type infiltrates probable left upper lobe infiltrate.  Study was not conclusive to rule out PE and VQ scan was performed.  Results not available to me, patient states negative for PE. Patient was treated for pneumonia.  Video imaging of the oropharynx was discussed patient declined.  \par \par Patient was discharged on oral antibiotic therapy.  He stopped smoking and vaping since discharge.  He restarted the Plavix no further hemoptysis  Drug-eluting stent LAD was placed December 2013.   Discussed risk and options of long term Plavix and he wishes to continue.  \par \par Exercise Myoview stress test June 2021, LVEF 57%, normal perfusion, diaphragm attenuation seen, nonischemic submaximal EKG response, hypertensive blood pressure response, no chest pain, baseline sinus rhythm\par \par Echocardiogram March 2021, LVEF 60%, severe eccentric MR, mild TR, normal RVSP, ascending aorta 4.3 cm\par \par 2-D echo January 2018 LV of 60%, normal diastolic function, normal Doppler, aortic root 4.5cm,  ascending aorta 4.2cm. \par \par Carotid and abdominal ultrasound January 2018 unobstructive, normal abdominal aortic size.\par \par Stress echo March 2016 LVEF 60% normal in size wall motion no ischemia, equivocal EKG response, no anginal symptoms, hypertensive blood pressures response, 80% MPHR 9 minutes Huseyin protocol\par \par Echocardiogram 3/17/16. EF: 55%, mild LAE and LV E. No LVH. Normal systolic function. Mild diastolic dysfunction. Mildly dilated aortic root. Aortic arch measures 2.8 cm, ascending aorta measures 4.3 cm. Trace to mild valvulopathy.\par \par Carotid ultrasound 3/17/16. Bilateral intimal thickening. No change noted.\par \par Abdominal ultrasound 3/17/16. No abdominal aortic aneurysm noted. No prominent or mobile plaque noted. Stress echocardiogram 3/24/16. Completed nine minutes and nine seconds of Huseyin protocol achieving 88% of MPHR. Peak blood pressure was 194/100 mmHg off of his Toprol. Equivocal for ischemia by EKG. Normal augmentation of left ventricular function wall motion. Negative for exercise-induced myocardial ischemia.\par

## 2021-07-27 NOTE — DISCUSSION/SUMMARY
[FreeTextEntry1] : Terrence is a 61 year-old male  with medical history detailed above and active medical issues including:\par \par - Chronic stable angina, normal Myoview perfusion with normal LVEF June 2021.\par \par - Moderate severe eccentric MR, normal LVEF echo March 2021.  Scheduled for 6 months repeat echo limited for MR and LVEF.  Patient declined transesophageal echocardiogram. \par \par - Coronary artery disease, unstable angina, non-Q MI, drug-eluting stent of the LAD in December 2013, stable on aspirin, Plavix, and Toprol, normalized LV systolic function and wall motion. Patient wishes to continue long-term aspirin and Plavix therapy. \par \par - Ascending aortic aneurysm 4.3cm unchanged,  echo March 2021. Patient reminded to avoid heavy weight lifting. \par \par - History of brief hemoptysis resolved, prior smoker, declines pulmonology follow-up, continue annual chest x-ray\par \par - Dyslipidemia, on high intensity statin therapy, stable.\par \par - Tobacco addiction, quit in December 2013, and again 1/9/16 after hemoptysis admission [facility].\par \par Advised patient to follow active lifestyle with regular cardiovascular exercise. Patient educated on lifestyle and diet modification with low sodium low fat diet and avoidance of excessive alcohol. Patient is aware to call with any symptoms or concerns. \par \par Patient will be seen in cardiology follow-up after noninvasive testing. \par \par Current cardiac medications remain unchanged. Repeat labs will be ordered with PMD.\par \par Umer will follow up with Dr. Katie Barriga for primary care.\par

## 2021-08-10 ENCOUNTER — APPOINTMENT (OUTPATIENT)
Dept: CARDIOLOGY | Facility: CLINIC | Age: 61
End: 2021-08-10

## 2021-09-14 ENCOUNTER — NON-APPOINTMENT (OUTPATIENT)
Age: 61
End: 2021-09-14

## 2021-09-16 ENCOUNTER — APPOINTMENT (OUTPATIENT)
Dept: CARDIOLOGY | Facility: CLINIC | Age: 61
End: 2021-09-16

## 2021-10-11 ENCOUNTER — APPOINTMENT (OUTPATIENT)
Dept: CARDIOLOGY | Facility: CLINIC | Age: 61
End: 2021-10-11

## 2021-11-18 ENCOUNTER — APPOINTMENT (OUTPATIENT)
Dept: CARDIOLOGY | Facility: CLINIC | Age: 61
End: 2021-11-18
Payer: COMMERCIAL

## 2021-11-18 VITALS
HEIGHT: 73 IN | HEART RATE: 66 BPM | SYSTOLIC BLOOD PRESSURE: 142 MMHG | OXYGEN SATURATION: 96 % | TEMPERATURE: 97.4 F | WEIGHT: 230 LBS | DIASTOLIC BLOOD PRESSURE: 70 MMHG | BODY MASS INDEX: 30.48 KG/M2

## 2021-11-18 PROCEDURE — 99214 OFFICE O/P EST MOD 30 MIN: CPT

## 2021-11-18 RX ORDER — HYDROCODONE BITARTRATE AND ACETAMINOPHEN 5; 325 MG/1; MG/1
5-325 TABLET ORAL
Qty: 20 | Refills: 0 | Status: DISCONTINUED | COMMUNITY
Start: 2021-03-19 | End: 2021-11-18

## 2021-11-18 NOTE — REASON FOR VISIT
[Other: ____] : [unfilled] [FreeTextEntry1] : Terrence is a 61-year-old male with a history of dyslipidemia, hypertension,  angina, NSTEMI,  RENETTA LAD Dec 2013, mild cardiomyopathy with normalization of LVEF 60% echo March 2021, prior tobacco, quit in December 2013 and Jan 2016.\par \par Cardiovascular review of symptoms is negative for exertional chest pain, dyspnea, palpitations, dizziness or syncope. No PND or orthopnea leg edema. No bleeding or black stool.\par \par Patient is physically active walking more than 20 minutes without exertional chest pain.  Patient is clamming in the chest deep water wearing a wet suit several hours a day during the winter. Patient's brother had sudden death with prior DVT after horse kicked his leg.\par \par Patient no longer working out in the gym in the setting of Covid panndemic. He is working as a  at [employer]. \par \par Patient had admission to [facility] January 9, 2016 for coughing and hemoptysis.  Patient states that he had been violently coughing productive yellow sputum and subsequent small amount of blood.  He became quite anxious that he may have "lung cancer".  He was admitted to [facility] evaluated by pulmonologist Dr. Mitchell.  CT of the chest revealing scattered ground glass hazy nodular-type infiltrates probable left upper lobe infiltrate.  Study was not conclusive to rule out PE and VQ scan was performed.  Results not available to me, patient states negative for PE. Patient was treated for pneumonia.  Video imaging of the oropharynx was discussed patient declined.  \par \par Patient was discharged on oral antibiotic therapy.  He stopped smoking and vaping since discharge.  He restarted the Plavix no further hemoptysis  Drug-eluting stent LAD was placed December 2013.   Discussed risk and options of long term Plavix and he wishes to continue.  \par \par Exercise Myoview stress test June 2021, LVEF 57%, normal perfusion, diaphragm attenuation seen, nonischemic submaximal EKG response, hypertensive blood pressure response, no chest pain, baseline sinus rhythm\par \par Echocardiogram March 2021, LVEF 60%, severe eccentric MR, mild TR, normal RVSP, ascending aorta 4.3 cm\par \par 2-D echo January 2018 LV of 60%, normal diastolic function, normal Doppler, aortic root 4.5cm,  ascending aorta 4.2cm. \par \par Carotid and abdominal ultrasound January 2018 unobstructive, normal abdominal aortic size.\par \par Stress echo March 2016 LVEF 60% normal in size wall motion no ischemia, equivocal EKG response, no anginal symptoms, hypertensive blood pressures response, 80% MPHR 9 minutes Huseyin protocol\par \par Echocardiogram 3/17/16. EF: 55%, mild LAE and LV E. No LVH. Normal systolic function. Mild diastolic dysfunction. Mildly dilated aortic root. Aortic arch measures 2.8 cm, ascending aorta measures 4.3 cm. Trace to mild valvulopathy.\par \par Carotid ultrasound 3/17/16. Bilateral intimal thickening. No change noted.\par \par Abdominal ultrasound 3/17/16. No abdominal aortic aneurysm noted. No prominent or mobile plaque noted. Stress echocardiogram 3/24/16. Completed nine minutes and nine seconds of Huseyin protocol achieving 88% of MPHR. Peak blood pressure was 194/100 mmHg off of his Toprol. Equivocal for ischemia by EKG. Normal augmentation of left ventricular function wall motion. Negative for exercise-induced myocardial ischemia.\par

## 2021-11-18 NOTE — PHYSICAL EXAM
[Well Developed] : well developed [Well Nourished] : well nourished [No Acute Distress] : no acute distress [Normal Conjunctiva] : normal conjunctiva [Normal Venous Pressure] : normal venous pressure [No Carotid Bruit] : no carotid bruit [Normal S1, S2] : normal S1, S2 [No Rub] : no rub [No Gallop] : no gallop [Clear Lung Fields] : clear lung fields [Good Air Entry] : good air entry [No Respiratory Distress] : no respiratory distress  [Soft] : abdomen soft [Non Tender] : non-tender [No Masses/organomegaly] : no masses/organomegaly [Normal Bowel Sounds] : normal bowel sounds [Normal Gait] : normal gait [No Edema] : no edema [No Cyanosis] : no cyanosis [No Clubbing] : no clubbing [No Varicosities] : no varicosities [No Rash] : no rash [No Skin Lesions] : no skin lesions [Moves all extremities] : moves all extremities [No Focal Deficits] : no focal deficits [Normal Speech] : normal speech [Alert and Oriented] : alert and oriented [Normal memory] : normal memory [General Appearance - Well Developed] : well developed [Normal Appearance] : normal appearance [Well Groomed] : well groomed [General Appearance - Well Nourished] : well nourished [No Deformities] : no deformities [General Appearance - In No Acute Distress] : no acute distress [Normal Jugular Venous A Waves Present] : normal jugular venous A waves present [Normal Jugular Venous V Waves Present] : normal jugular venous V waves present [No Jugular Venous Downing A Waves] : no jugular venous downing A waves [Respiration, Rhythm And Depth] : normal respiratory rhythm and effort [Exaggerated Use Of Accessory Muscles For Inspiration] : no accessory muscle use [Auscultation Breath Sounds / Voice Sounds] : lungs were clear to auscultation bilaterally [Abdomen Soft] : soft [Abdomen Tenderness] : non-tender [Abdomen Mass (___ Cm)] : no abdominal mass palpated [Abnormal Walk] : normal gait [Gait - Sufficient For Exercise Testing] : the gait was sufficient for exercise testing [Nail Clubbing] : no clubbing of the fingernails [Cyanosis, Localized] : no localized cyanosis [Petechial Hemorrhages (___cm)] : no petechial hemorrhages [Skin Color & Pigmentation] : normal skin color and pigmentation [] : no rash [No Venous Stasis] : no venous stasis [No Skin Ulcers] : no skin ulcer [Skin Lesions] : no skin lesions [No Xanthoma] : no  xanthoma was observed [Oriented To Time, Place, And Person] : oriented to person, place, and time [Affect] : the affect was normal [Mood] : the mood was normal [No Anxiety] : not feeling anxious [de-identified] : 2/6 KRISTY MR

## 2021-11-18 NOTE — REASON FOR VISIT
[Other: ____] : [unfilled] [FreeTextEntry1] : Terrence is a 61-year-old male with a history of dyslipidemia, hypertension,  angina, NSTEMI,  RENETTA LAD Dec 2013, mild cardiomyopathy with normalization of LVEF 60% echo March 2021, prior tobacco, quit in December 2013 and Jan 2016.\par \par Cardiovascular review of symptoms is negative for exertional chest pain, dyspnea, palpitations, dizziness or syncope. No PND or orthopnea leg edema. No bleeding or black stool.\par \par Patient is physically active walking more than 20 minutes without exertional chest pain.  Patient is clamming in the chest deep water wearing a wet suit several hours a day during the winter. Patient's brother had sudden death with prior DVT after horse kicked his leg.\par \par Patient no longer working out in the gym in the setting of Covid panndemic. He is working as a  at [employer]. \par \par Patient had admission to [facility] January 9, 2016 for coughing and hemoptysis.  Patient states that he had been violently coughing productive yellow sputum and subsequent small amount of blood.  He became quite anxious that he may have "lung cancer".  He was admitted to [facility] evaluated by pulmonologist Dr. Mitchell.  CT of the chest revealing scattered ground glass hazy nodular-type infiltrates probable left upper lobe infiltrate.  Study was not conclusive to rule out PE and VQ scan was performed.  Results not available to me, patient states negative for PE. Patient was treated for pneumonia.  Video imaging of the oropharynx was discussed patient declined.  \par \par Patient was discharged on oral antibiotic therapy.  He stopped smoking and vaping since discharge.  He restarted the Plavix no further hemoptysis  Drug-eluting stent LAD was placed December 2013.   Discussed risk and options of long term Plavix and he wishes to continue.  \par \par Exercise Myoview stress test June 2021, LVEF 57%, normal perfusion, diaphragm attenuation seen, nonischemic submaximal EKG response, hypertensive blood pressure response, no chest pain, baseline sinus rhythm\par \par Echocardiogram March 2021, LVEF 60%, severe eccentric MR, mild TR, normal RVSP, ascending aorta 4.3 cm\par \par 2-D echo January 2018 LV of 60%, normal diastolic function, normal Doppler, aortic root 4.5cm,  ascending aorta 4.2cm. \par \par Carotid and abdominal ultrasound January 2018 unobstructive, normal abdominal aortic size.\par \par Stress echo March 2016 LVEF 60% normal in size wall motion no ischemia, equivocal EKG response, no anginal symptoms, hypertensive blood pressures response, 80% MPHR 9 minutes Huseyin protocol\par \par Echocardiogram 3/17/16. EF: 55%, mild LAE and LV E. No LVH. Normal systolic function. Mild diastolic dysfunction. Mildly dilated aortic root. Aortic arch measures 2.8 cm, ascending aorta measures 4.3 cm. Trace to mild valvulopathy.\par \par Carotid ultrasound 3/17/16. Bilateral intimal thickening. No change noted.\par \par Abdominal ultrasound 3/17/16. No abdominal aortic aneurysm noted. No prominent or mobile plaque noted. Stress echocardiogram 3/24/16. Completed nine minutes and nine seconds of Huseyin protocol achieving 88% of MPHR. Peak blood pressure was 194/100 mmHg off of his Toprol. Equivocal for ischemia by EKG. Normal augmentation of left ventricular function wall motion. Negative for exercise-induced myocardial ischemia.\par

## 2021-11-18 NOTE — PHYSICAL EXAM
[Well Developed] : well developed [Well Nourished] : well nourished [No Acute Distress] : no acute distress [Normal Conjunctiva] : normal conjunctiva [Normal Venous Pressure] : normal venous pressure [No Carotid Bruit] : no carotid bruit [Normal S1, S2] : normal S1, S2 [No Rub] : no rub [No Gallop] : no gallop [Clear Lung Fields] : clear lung fields [Good Air Entry] : good air entry [No Respiratory Distress] : no respiratory distress  [Soft] : abdomen soft [Non Tender] : non-tender [No Masses/organomegaly] : no masses/organomegaly [Normal Bowel Sounds] : normal bowel sounds [Normal Gait] : normal gait [No Edema] : no edema [No Cyanosis] : no cyanosis [No Clubbing] : no clubbing [No Varicosities] : no varicosities [No Rash] : no rash [No Skin Lesions] : no skin lesions [Moves all extremities] : moves all extremities [No Focal Deficits] : no focal deficits [Normal Speech] : normal speech [Alert and Oriented] : alert and oriented [Normal memory] : normal memory [General Appearance - Well Developed] : well developed [Normal Appearance] : normal appearance [Well Groomed] : well groomed [General Appearance - Well Nourished] : well nourished [No Deformities] : no deformities [General Appearance - In No Acute Distress] : no acute distress [Normal Jugular Venous A Waves Present] : normal jugular venous A waves present [Normal Jugular Venous V Waves Present] : normal jugular venous V waves present [No Jugular Venous Downing A Waves] : no jugular venous downing A waves [Respiration, Rhythm And Depth] : normal respiratory rhythm and effort [Exaggerated Use Of Accessory Muscles For Inspiration] : no accessory muscle use [Auscultation Breath Sounds / Voice Sounds] : lungs were clear to auscultation bilaterally [Abdomen Soft] : soft [Abdomen Tenderness] : non-tender [Abdomen Mass (___ Cm)] : no abdominal mass palpated [Abnormal Walk] : normal gait [Gait - Sufficient For Exercise Testing] : the gait was sufficient for exercise testing [Nail Clubbing] : no clubbing of the fingernails [Cyanosis, Localized] : no localized cyanosis [Petechial Hemorrhages (___cm)] : no petechial hemorrhages [Skin Color & Pigmentation] : normal skin color and pigmentation [] : no rash [No Venous Stasis] : no venous stasis [Skin Lesions] : no skin lesions [No Skin Ulcers] : no skin ulcer [No Xanthoma] : no  xanthoma was observed [Oriented To Time, Place, And Person] : oriented to person, place, and time [Affect] : the affect was normal [Mood] : the mood was normal [No Anxiety] : not feeling anxious [de-identified] : 2/6 KRISTY MR

## 2022-03-03 ENCOUNTER — APPOINTMENT (OUTPATIENT)
Dept: CARDIOLOGY | Facility: CLINIC | Age: 62
End: 2022-03-03
Payer: COMMERCIAL

## 2022-03-03 VITALS
OXYGEN SATURATION: 93 % | BODY MASS INDEX: 31.14 KG/M2 | TEMPERATURE: 97.5 F | WEIGHT: 235 LBS | DIASTOLIC BLOOD PRESSURE: 80 MMHG | SYSTOLIC BLOOD PRESSURE: 162 MMHG | HEART RATE: 87 BPM | HEIGHT: 73 IN

## 2022-03-03 PROCEDURE — 99214 OFFICE O/P EST MOD 30 MIN: CPT

## 2022-03-03 NOTE — PHYSICAL EXAM
[Well Developed] : well developed [Well Nourished] : well nourished [No Acute Distress] : no acute distress [Normal Conjunctiva] : normal conjunctiva [Normal Venous Pressure] : normal venous pressure [No Carotid Bruit] : no carotid bruit [Normal S1, S2] : normal S1, S2 [No Rub] : no rub [No Gallop] : no gallop [Clear Lung Fields] : clear lung fields [Good Air Entry] : good air entry [No Respiratory Distress] : no respiratory distress  [Soft] : abdomen soft [Non Tender] : non-tender [No Masses/organomegaly] : no masses/organomegaly [Normal Bowel Sounds] : normal bowel sounds [Normal Gait] : normal gait [No Edema] : no edema [No Cyanosis] : no cyanosis [No Clubbing] : no clubbing [No Varicosities] : no varicosities [No Rash] : no rash [No Skin Lesions] : no skin lesions [Moves all extremities] : moves all extremities [No Focal Deficits] : no focal deficits [Normal Speech] : normal speech [Alert and Oriented] : alert and oriented [Normal memory] : normal memory [General Appearance - Well Developed] : well developed [Normal Appearance] : normal appearance [Well Groomed] : well groomed [General Appearance - Well Nourished] : well nourished [No Deformities] : no deformities [General Appearance - In No Acute Distress] : no acute distress [Normal Jugular Venous A Waves Present] : normal jugular venous A waves present [Normal Jugular Venous V Waves Present] : normal jugular venous V waves present [No Jugular Venous Downing A Waves] : no jugular venous downing A waves [Respiration, Rhythm And Depth] : normal respiratory rhythm and effort [Exaggerated Use Of Accessory Muscles For Inspiration] : no accessory muscle use [Auscultation Breath Sounds / Voice Sounds] : lungs were clear to auscultation bilaterally [Abdomen Soft] : soft [Abdomen Tenderness] : non-tender [Abdomen Mass (___ Cm)] : no abdominal mass palpated [Abnormal Walk] : normal gait [Gait - Sufficient For Exercise Testing] : the gait was sufficient for exercise testing [Cyanosis, Localized] : no localized cyanosis [Nail Clubbing] : no clubbing of the fingernails [Petechial Hemorrhages (___cm)] : no petechial hemorrhages [Skin Color & Pigmentation] : normal skin color and pigmentation [] : no rash [No Venous Stasis] : no venous stasis [Skin Lesions] : no skin lesions [No Skin Ulcers] : no skin ulcer [No Xanthoma] : no  xanthoma was observed [Oriented To Time, Place, And Person] : oriented to person, place, and time [Affect] : the affect was normal [Mood] : the mood was normal [No Anxiety] : not feeling anxious [de-identified] : 2/6 KRISTY MR

## 2022-03-03 NOTE — DISCUSSION/SUMMARY
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  Scheduled for 6 months repeat echo limited for MR and LVEF.  Patient declined transesophageal echocardiogram. \par \par - Coronary artery disease, unstable angina, non-Q MI, drug-eluting stent of the LAD in December 2013, stable on aspirin, Plavix, and Toprol, normalized LV systolic function and wall motion. Patient wishes to continue long-term aspirin and Plavix therapy. \par \par - Ascending aortic aneurysm 4.3cm unchanged,  echo March 2021. Patient reminded to avoid heavy weight lifting. \par \par - History of brief hemoptysis resolved, prior smoker, declines pulmonology follow-up, continue annual chest x-ray\par \par - Labile hypertension recent blood pressure elevation in the setting of emotional stress, repeat average resting home BPs on current meds\par \par - Dyslipidemia, on high intensity statin therapy, stable.\par \par - Tobacco addiction, quit in December 2013, and again 1/9/16 after hemoptysis admission [facility].\par \par Advised patient to follow active lifestyle with regular cardiovascular exercise. Patient educated on lifestyle and diet modification with low sodium low fat diet and avoidance of excessive alcohol. Patient is aware to call with any symptoms or concerns. \par \par Echocardiogram ordered to evaluate for structural heart disease with TEB follow-up.  Patient will be seen in cardiology follow-up 6 months\par \par Current cardiac medications remain unchanged. Repeat labs will be ordered with PMD.\par \par Umer will follow up with Dr. Katie Barriga for primary care.\par

## 2022-03-03 NOTE — REASON FOR VISIT
[Other: ____] : [unfilled] [FreeTextEntry1] : Terrence is a 62-year-old male with a history of dyslipidemia, hypertension,  angina, NSTEMI,  RENETTA LAD Dec 2013, mild cardiomyopathy with normalization of LVEF 60% echo March 2021, prior tobacco, quit in December 2013 and Jan 2016, moderate severe MR normal LVEF echo March 2021\par \par Cardiovascular review of symptoms is negative for exertional chest pain, dyspnea, palpitations, dizziness or syncope. No PND or orthopnea leg edema. No bleeding or black stool.\par \par Patient is physically active walking more than 20 minutes without exertional chest pain.  Patient is clamming in the chest deep water wearing a wet suit several hours a day during the winter. Patient's brother had sudden death with prior DVT after horse kicked his leg.\par \par Patient's been under recent emotional stress father passed away Feb 2022 and brother-in-law with Covid on ventilator March 2022.  ER evaluation [facility] 2/27/2022 for "feeling weird" normal evaluation discharged for outpatient cardiology follow-up.\par \par Patient returning to work as a  at [employer] starting April 2022. \par \par Patient had admission to [facility] January 9, 2016 for coughing and hemoptysis.  Patient states that he had been violently coughing productive yellow sputum and subsequent small amount of blood.  He became quite anxious that he may have "lung cancer".  He was admitted to [facility] evaluated by pulmonologist Dr. Mitchell.  CT of the chest revealing scattered ground glass hazy nodular-type infiltrates probable left upper lobe infiltrate.  Study was not conclusive to rule out PE and VQ scan was performed.  Results not available to me, patient states negative for PE. Patient was treated for pneumonia.  Video imaging of the oropharynx was discussed patient declined.  \par \par Patient was discharged on oral antibiotic therapy.  He stopped smoking and vaping since discharge.  He restarted the Plavix no further hemoptysis  Drug-eluting stent LAD was placed December 2013.   Discussed risk and options of long term Plavix and he wishes to continue.  \par \par Exercise Myoview stress test June 2021, LVEF 57%, normal perfusion, diaphragm attenuation seen, nonischemic submaximal EKG response, hypertensive blood pressure response, no chest pain, baseline sinus rhythm\par \par Echocardiogram March 2021, LVEF 60%, severe eccentric MR, mild TR, normal RVSP, ascending aorta 4.3 cm\par \par 2-D echo January 2018 LV of 60%, normal diastolic function, normal Doppler, aortic root 4.5cm,  ascending aorta 4.2cm. \par \par Carotid and abdominal ultrasound January 2018 unobstructive, normal abdominal aortic size.\par \par Stress echo March 2016 LVEF 60% normal in size wall motion no ischemia, equivocal EKG response, no anginal symptoms, hypertensive blood pressures response, 80% MPHR 9 minutes Huseyin protocol\par \par Echocardiogram 3/17/16. EF: 55%, mild LAE and LV E. No LVH. Normal systolic function. Mild diastolic dysfunction. Mildly dilated aortic root. Aortic arch measures 2.8 cm, ascending aorta measures 4.3 cm. Trace to mild valvulopathy.\par \par Carotid ultrasound 3/17/16. Bilateral intimal thickening. No change noted.\par \par Abdominal ultrasound 3/17/16. No abdominal aortic aneurysm noted. No prominent or mobile plaque noted. Stress echocardiogram 3/24/16. Completed nine minutes and nine seconds of Huseyin protocol achieving 88% of MPHR. Peak blood pressure was 194/100 mmHg off of his Toprol. Equivocal for ischemia by EKG. Normal augmentation of left ventricular function wall motion. Negative for exercise-induced myocardial ischemia.\par

## 2022-03-10 ENCOUNTER — APPOINTMENT (OUTPATIENT)
Dept: CARDIOLOGY | Facility: CLINIC | Age: 62
End: 2022-03-10
Payer: COMMERCIAL

## 2022-03-10 VITALS
SYSTOLIC BLOOD PRESSURE: 142 MMHG | OXYGEN SATURATION: 97 % | DIASTOLIC BLOOD PRESSURE: 72 MMHG | HEART RATE: 91 BPM | WEIGHT: 235 LBS | BODY MASS INDEX: 31.14 KG/M2 | HEIGHT: 73 IN | TEMPERATURE: 97.1 F

## 2022-03-10 PROCEDURE — 99214 OFFICE O/P EST MOD 30 MIN: CPT

## 2022-03-10 PROCEDURE — 93306 TTE W/DOPPLER COMPLETE: CPT

## 2022-03-10 NOTE — DISCUSSION/SUMMARY
[FreeTextEntry1] : Terrence is a 62 year-old male  with medical history detailed above and active medical issues including:\par \par - Chronic stable angina, normal Myoview perfusion with normal LVEF June 2021.\par \par -Posterior mitral valve leaflet prolapse with moderate eccentric MR, normal LVEF echo March 2022.   \par \par - Coronary artery disease, unstable angina, non-Q MI, drug-eluting stent of the LAD in December 2013, stable on aspirin, Plavix, and Toprol, normalized LV systolic function and wall motion. Patient wishes to continue long-term aspirin and Plavix therapy. \par \par - Ascending aortic aneurysm 4.3cm unchanged,  echo March 2021. Patient reminded to avoid heavy weight lifting. \par \par - History of brief hemoptysis resolved, prior smoker, declines pulmonology follow-up, continue annual chest x-ray\par \par - Labile hypertension recent blood pressure elevation in the setting of emotional stress, repeat average resting home BPs on current meds\par \par - Dyslipidemia, on high intensity statin therapy, stable.\par \par - Tobacco addiction, quit in December 2013, and again 1/9/16 after hemoptysis admission [facility].\par \par Advised patient to follow active lifestyle with regular cardiovascular exercise. Patient educated on lifestyle and diet modification with low sodium low fat diet and avoidance of excessive alcohol. Patient is aware to call with any symptoms or concerns. \par \par Cardiology follow-up 1 year at patient request.  Current cardiac medications remain unchanged and renewals  are up to date. Repeat labs will be ordered with PMD.\par \par Umer will follow up with Dr. Katie Barriga for primary care.\par

## 2022-03-10 NOTE — PHYSICAL EXAM
[Well Developed] : well developed [Well Nourished] : well nourished [No Acute Distress] : no acute distress [Normal Conjunctiva] : normal conjunctiva [Normal Venous Pressure] : normal venous pressure [No Carotid Bruit] : no carotid bruit [Normal S1, S2] : normal S1, S2 [No Rub] : no rub [No Gallop] : no gallop [Clear Lung Fields] : clear lung fields [Good Air Entry] : good air entry [No Respiratory Distress] : no respiratory distress  [Soft] : abdomen soft [Non Tender] : non-tender [No Masses/organomegaly] : no masses/organomegaly [Normal Gait] : normal gait [Normal Bowel Sounds] : normal bowel sounds [No Edema] : no edema [No Cyanosis] : no cyanosis [No Clubbing] : no clubbing [No Varicosities] : no varicosities [No Rash] : no rash [No Skin Lesions] : no skin lesions [Moves all extremities] : moves all extremities [No Focal Deficits] : no focal deficits [Normal Speech] : normal speech [Alert and Oriented] : alert and oriented [Normal memory] : normal memory [General Appearance - Well Developed] : well developed [Normal Appearance] : normal appearance [Well Groomed] : well groomed [General Appearance - Well Nourished] : well nourished [No Deformities] : no deformities [General Appearance - In No Acute Distress] : no acute distress [Normal Jugular Venous A Waves Present] : normal jugular venous A waves present [Normal Jugular Venous V Waves Present] : normal jugular venous V waves present [No Jugular Venous Downing A Waves] : no jugular venous downing A waves [Respiration, Rhythm And Depth] : normal respiratory rhythm and effort [Auscultation Breath Sounds / Voice Sounds] : lungs were clear to auscultation bilaterally [Exaggerated Use Of Accessory Muscles For Inspiration] : no accessory muscle use [Abdomen Soft] : soft [Abdomen Tenderness] : non-tender [Abnormal Walk] : normal gait [Abdomen Mass (___ Cm)] : no abdominal mass palpated [Gait - Sufficient For Exercise Testing] : the gait was sufficient for exercise testing [Nail Clubbing] : no clubbing of the fingernails [Cyanosis, Localized] : no localized cyanosis [Petechial Hemorrhages (___cm)] : no petechial hemorrhages [Skin Color & Pigmentation] : normal skin color and pigmentation [] : no rash [No Venous Stasis] : no venous stasis [Skin Lesions] : no skin lesions [No Skin Ulcers] : no skin ulcer [No Xanthoma] : no  xanthoma was observed [Oriented To Time, Place, And Person] : oriented to person, place, and time [Affect] : the affect was normal [Mood] : the mood was normal [No Anxiety] : not feeling anxious [de-identified] : 2/6 KRISTY MR

## 2022-03-10 NOTE — REASON FOR VISIT
[Other: ____] : [unfilled] [FreeTextEntry1] : Terrence is a 62-year-old male with a history of dyslipidemia, hypertension,  angina, NSTEMI,  RENETTA LAD Dec 2013, mild cardiomyopathy with normalization of LVEF 60% echo March 2021, prior tobacco, quit in December 2013 and Jan 2016, moderate MR normal LVEF echo March 2022\par \par Cardiovascular review of symptoms is negative for exertional chest pain, dyspnea, palpitations, dizziness or syncope. No PND or orthopnea leg edema. No bleeding or black stool.\par \par Patient is physically active walking more than 20 minutes without exertional chest pain.  Patient is clamming in the chest deep water wearing a wet suit several hours a day during the winter. Patient's brother had sudden death with prior DVT after horse kicked his leg.\par \par Patient's been under recent emotional stress father passed away Feb 2022 and brother-in-law with Covid on ventilator March 2022.  ER evaluation [facility] 2/27/2022 for "feeling weird" normal evaluation discharged for outpatient cardiology follow-up.\par \par Patient returning to work as a  at [employer] starting April 2022. \par \par Patient had admission to [facility] January 9, 2016 for coughing and hemoptysis.  Patient states that he had been violently coughing productive yellow sputum and subsequent small amount of blood.  He became quite anxious that he may have "lung cancer".  He was admitted to [facility] evaluated by pulmonologist Dr. Mitchell.  CT of the chest revealing scattered ground glass hazy nodular-type infiltrates probable left upper lobe infiltrate.  Study was not conclusive to rule out PE and VQ scan was performed.  Results not available to me, patient states negative for PE. Patient was treated for pneumonia.  Video imaging of the oropharynx was discussed patient declined.  \par \par Patient was discharged on oral antibiotic therapy.  He stopped smoking and vaping since discharge.  He restarted the Plavix no further hemoptysis  Drug-eluting stent LAD was placed December 2013.   Discussed risk and options of long term Plavix and he wishes to continue.\par \par Echocardiogram March 2022 LVEF 60%, posterior leaflet mitral valve prolapse moderate MR, mild TR, normal RVSP\par \par Exercise Myoview stress test June 2021, LVEF 57%, normal perfusion, diaphragm attenuation seen, nonischemic submaximal EKG response, hypertensive blood pressure response, no chest pain, baseline sinus rhythm\par \par Echocardiogram March 2021, LVEF 60%, severe eccentric MR, mild TR, normal RVSP, ascending aorta 4.3 cm\par \par 2-D echo January 2018 LV of 60%, normal diastolic function, normal Doppler, aortic root 4.5cm,  ascending aorta 4.2cm. \par \par Carotid and abdominal ultrasound January 2018 unobstructive, normal abdominal aortic size.\par \par Stress echo March 2016 LVEF 60% normal in size wall motion no ischemia, equivocal EKG response, no anginal symptoms, hypertensive blood pressures response, 80% MPHR 9 minutes Huseyin protocol\par \par Echocardiogram 3/17/16. EF: 55%, mild LAE and LV E. No LVH. Normal systolic function. Mild diastolic dysfunction. Mildly dilated aortic root. Aortic arch measures 2.8 cm, ascending aorta measures 4.3 cm. Trace to mild valvulopathy.\par \par Carotid ultrasound 3/17/16. Bilateral intimal thickening. No change noted.\par \par Abdominal ultrasound 3/17/16. No abdominal aortic aneurysm noted. No prominent or mobile plaque noted. Stress echocardiogram 3/24/16. Completed nine minutes and nine seconds of Huseyin protocol achieving 88% of MPHR. Peak blood pressure was 194/100 mmHg off of his Toprol. Equivocal for ischemia by EKG. Normal augmentation of left ventricular function wall motion. Negative for exercise-induced myocardial ischemia.\par

## 2022-07-05 ENCOUNTER — FORM ENCOUNTER (OUTPATIENT)
Age: 62
End: 2022-07-05

## 2022-07-26 ENCOUNTER — RX RENEWAL (OUTPATIENT)
Age: 62
End: 2022-07-26

## 2022-08-03 ENCOUNTER — APPOINTMENT (OUTPATIENT)
Dept: ORTHOPEDIC SURGERY | Facility: CLINIC | Age: 62
End: 2022-08-03

## 2022-08-03 DIAGNOSIS — M70.22 OLECRANON BURSITIS, LEFT ELBOW: ICD-10-CM

## 2022-08-03 PROCEDURE — 99203 OFFICE O/P NEW LOW 30 MIN: CPT

## 2022-08-03 NOTE — DISCUSSION/SUMMARY
[de-identified] : I reviewed patient's radiographs and discussed his condition and treatment options.  Defer aspiration.  Provided him with ACE wrap and advised offloading the area.  Patient voiced understanding and agreement with the plan.\par

## 2022-08-03 NOTE — HISTORY OF PRESENT ILLNESS
[Intermittent] : intermittent [de-identified] : Patient presents today for evaluation on left elbow pain. Patient states problem started early July when he bumped the left elbow on wood.  He was having worsening swelling so went to [facility] ER on 7/3/22 and had xrays done.  He is RHD.  He has been wearing compressive wrap and reports decreased pain and swelling but is concerned the swelling remains. [] : no [de-identified] : 7/3/22 [de-identified] : [facility] ER  [de-identified] : XRs

## 2022-08-03 NOTE — PHYSICAL EXAM
[NL (150)] : flexion 150 degrees [NL (0)] : extension 0 degrees [NL (90)] : supination 90 degrees [] : light touch intact [Left] : left elbow [Outside films reviewed] : Outside films reviewed [There are no fractures, subluxations or dislocations. No significant abnormalities are seen] : There are no fractures, subluxations or dislocations. No significant abnormalities are seen

## 2022-11-04 NOTE — OBJECTIVE
[History reviewed] : History reviewed. [Medications and Allergies reviewed] : Medications and allergies reviewed. Satisfactory

## 2022-11-08 ENCOUNTER — APPOINTMENT (OUTPATIENT)
Dept: CARDIOLOGY | Facility: CLINIC | Age: 62
End: 2022-11-08

## 2022-11-08 VITALS
DIASTOLIC BLOOD PRESSURE: 72 MMHG | WEIGHT: 235 LBS | HEART RATE: 69 BPM | OXYGEN SATURATION: 97 % | BODY MASS INDEX: 31.14 KG/M2 | SYSTOLIC BLOOD PRESSURE: 138 MMHG | HEIGHT: 73 IN | TEMPERATURE: 97.1 F

## 2022-11-08 PROCEDURE — 99215 OFFICE O/P EST HI 40 MIN: CPT

## 2022-11-08 NOTE — DISCUSSION/SUMMARY
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   \par \par - Coronary artery disease, unstable angina, non-Q MI, drug-eluting stent of the LAD in December 2013, stable on aspirin, Plavix, and Toprol, normalized LV systolic function and wall motion. Patient wishes to continue long-term aspirin and Plavix therapy. \par \par - Ascending aortic aneurysm 4.3cm unchanged,  echo March 2021. Patient reminded to avoid heavy weight lifting. \par \par - History of brief hemoptysis resolved, prior smoker, declines pulmonology follow-up, continue annual chest x-ray\par \par - Labile hypertension average resting home BPs at guideline goal on Toprol\par \par - Dyslipidemia, on high intensity statin therapy, stable.\par \par - Tobacco addiction, quit in December 2013, and again 1/9/16 after hemoptysis admission [facility].\par \par Advised patient to follow active lifestyle with regular cardiovascular exercise. Patient educated on lifestyle and diet modification with low sodium low fat diet and avoidance of excessive alcohol. Patient is aware to call with any symptoms or concerns. \par \par Echocardiogram ordered to evaluate for structural heart disease, carotid and abdominal ultrasound to evaluate for PAD in 6 months.  Cardiology follow-up 6 months.  Current cardiac medications remain unchanged and renewals  are up to date. Repeat labs will be ordered with PMD.\par \par Umer will follow up with Dr. Katie Barriga for primary care.\par \par \par

## 2022-11-08 NOTE — PHYSICAL EXAM
[Well Developed] : well developed [Well Nourished] : well nourished [No Acute Distress] : no acute distress [Normal Conjunctiva] : normal conjunctiva [Normal Venous Pressure] : normal venous pressure [No Carotid Bruit] : no carotid bruit [Normal S1, S2] : normal S1, S2 [No Rub] : no rub [No Gallop] : no gallop [Clear Lung Fields] : clear lung fields [Good Air Entry] : good air entry [No Respiratory Distress] : no respiratory distress  [Soft] : abdomen soft [Non Tender] : non-tender [No Masses/organomegaly] : no masses/organomegaly [Normal Bowel Sounds] : normal bowel sounds [Normal Gait] : normal gait [No Edema] : no edema [No Cyanosis] : no cyanosis [No Clubbing] : no clubbing [No Varicosities] : no varicosities [No Rash] : no rash [No Skin Lesions] : no skin lesions [Moves all extremities] : moves all extremities [No Focal Deficits] : no focal deficits [Normal Speech] : normal speech [Alert and Oriented] : alert and oriented [Normal memory] : normal memory [General Appearance - Well Developed] : well developed [Normal Appearance] : normal appearance [Well Groomed] : well groomed [General Appearance - Well Nourished] : well nourished [No Deformities] : no deformities [General Appearance - In No Acute Distress] : no acute distress [Normal Jugular Venous A Waves Present] : normal jugular venous A waves present [Normal Jugular Venous V Waves Present] : normal jugular venous V waves present [No Jugular Venous Downing A Waves] : no jugular venous downing A waves [Respiration, Rhythm And Depth] : normal respiratory rhythm and effort [Exaggerated Use Of Accessory Muscles For Inspiration] : no accessory muscle use [Auscultation Breath Sounds / Voice Sounds] : lungs were clear to auscultation bilaterally [Abdomen Soft] : soft [Abdomen Tenderness] : non-tender [Abdomen Mass (___ Cm)] : no abdominal mass palpated [Abnormal Walk] : normal gait [Gait - Sufficient For Exercise Testing] : the gait was sufficient for exercise testing [Nail Clubbing] : no clubbing of the fingernails [Cyanosis, Localized] : no localized cyanosis [Petechial Hemorrhages (___cm)] : no petechial hemorrhages [Skin Color & Pigmentation] : normal skin color and pigmentation [] : no rash [No Venous Stasis] : no venous stasis [Skin Lesions] : no skin lesions [No Skin Ulcers] : no skin ulcer [No Xanthoma] : no  xanthoma was observed [Oriented To Time, Place, And Person] : oriented to person, place, and time [Affect] : the affect was normal [Mood] : the mood was normal [No Anxiety] : not feeling anxious [de-identified] : 2/6 KRISTY MR

## 2022-11-08 NOTE — REASON FOR VISIT
[Other: ____] : [unfilled] [FreeTextEntry1] : Terrence is a 62-year-old male with a history of dyslipidemia, hypertension,  angina, NSTEMI,  RENETTA LAD Dec 2013, mild cardiomyopathy with normalization of LVEF 60% echo March 2021, prior tobacco, quit in December 2013 and Jan 2016, moderate MR normal LVEF echo March 2022\par \par Cardiovascular review of symptoms is negative for exertional chest pain, dyspnea, palpitations, dizziness or syncope. No PND or orthopnea leg edema. No bleeding or black stool.\par \par Patient is physically active walking more than 20 minutes without exertional chest pain.  Patient is clamming in the chest deep water wearing a wet suit several hours a day during the winter. Patient's brother had sudden death with prior DVT after horse kicked his leg.\par \par Patient's been under recent emotional stress father passed away Feb 2022 and brother-in-law with Covid on ventilator March 2022.  ER evaluation [facility] 2/27/2022 for "feeling weird" normal evaluation discharged for outpatient cardiology follow-up.\par \par Patient returning to work as a  at [employer] starting April 2022. \par \par Patient had admission to [facility] January 9, 2016 for coughing and hemoptysis.  Patient states that he had been violently coughing productive yellow sputum and subsequent small amount of blood.  He became quite anxious that he may have "lung cancer".  He was admitted to [facility] evaluated by pulmonologist Dr. Mitchell.  CT of the chest revealing scattered ground glass hazy nodular-type infiltrates probable left upper lobe infiltrate.  Study was not conclusive to rule out PE and VQ scan was performed.  Results not available to me, patient states negative for PE. Patient was treated for pneumonia.  Video imaging of the oropharynx was discussed patient declined.  \par \par Patient was discharged on oral antibiotic therapy.  He stopped smoking and vaping since discharge.  He restarted the Plavix no further hemoptysis  Drug-eluting stent LAD was placed December 2013.   Discussed risk and options of long term Plavix and he wishes to continue.\par \par Echocardiogram March 2022 LVEF 60%, posterior leaflet mitral valve prolapse moderate MR, mild TR, normal RVSP\par \par Exercise Myoview stress test June 2021, LVEF 57%, normal perfusion, diaphragm attenuation seen, nonischemic submaximal EKG response, hypertensive blood pressure response, no chest pain, baseline sinus rhythm\par \par Echocardiogram March 2021, LVEF 60%, severe eccentric MR, mild TR, normal RVSP, ascending aorta 4.3 cm\par \par 2-D echo January 2018 LV of 60%, normal diastolic function, normal Doppler, aortic root 4.5cm,  ascending aorta 4.2cm. \par \par Carotid and abdominal ultrasound January 2018 unobstructive, normal abdominal aortic size.\par \par Stress echo March 2016 LVEF 60% normal in size wall motion no ischemia, equivocal EKG response, no anginal symptoms, hypertensive blood pressures response, 80% MPHR 9 minutes Huseyin protocol\par \par Echocardiogram 3/17/16. EF: 55%, mild LAE and LV E. No LVH. Normal systolic function. Mild diastolic dysfunction. Mildly dilated aortic root. Aortic arch measures 2.8 cm, ascending aorta measures 4.3 cm. Trace to mild valvulopathy.\par \par Carotid ultrasound 3/17/16. Bilateral intimal thickening. No change noted.\par \par Abdominal ultrasound 3/17/16. No abdominal aortic aneurysm noted. No prominent or mobile plaque noted. Stress echocardiogram 3/24/16. Completed nine minutes and nine seconds of Huseyin protocol achieving 88% of MPHR. Peak blood pressure was 194/100 mmHg off of his Toprol. Equivocal for ischemia by EKG. Normal augmentation of left ventricular function wall motion. Negative for exercise-induced myocardial ischemia.\par

## 2023-03-30 ENCOUNTER — RX RENEWAL (OUTPATIENT)
Age: 63
End: 2023-03-30

## 2023-04-26 PROBLEM — E66.3 OVERWEIGHT: Status: ACTIVE | Noted: 2017-10-30

## 2023-04-26 PROBLEM — I71.20 THORACIC AORTIC ANEURYSM WITHOUT RUPTURE: Status: ACTIVE | Noted: 2017-10-30

## 2023-04-26 NOTE — REASON FOR VISIT
[Other: ____] : [unfilled] [FreeTextEntry1] : Terrence is a 62-year-old male with a history of dyslipidemia, hypertension,  angina, NSTEMI,  RENETTA LAD Dec 2013, mild cardiomyopathy with normalization of LVEF 60% echo March 2021, prior tobacco, quit in December 2013 and Jan 2016, moderate MR normal LVEF echo March 2022\par \par Cardiovascular review of symptoms is negative for exertional chest pain, dyspnea, palpitations, dizziness or syncope. No PND or orthopnea leg edema. No bleeding or black stool.\par \par Patient is physically active walking more than 20 minutes without exertional chest pain.  Patient is clamming in the chest deep water wearing a wet suit several hours a day during the winter. Patient's brother had sudden death with prior DVT after horse kicked his leg.\par \par Patient's been under recent emotional stress father passed away Feb 2022 and brother-in-law with Covid on ventilator March 2022.  ER evaluation [facility] 2/27/2022 for "feeling weird" normal evaluation discharged for outpatient cardiology follow-up.\par \par Patient returning to work as a  at [employer] starting April 2022. \par \par Patient had admission to [facility] January 9, 2016 for coughing and hemoptysis.  Patient states that he had been violently coughing productive yellow sputum and subsequent small amount of blood.  He became quite anxious that he may have "lung cancer".  He was admitted to [facility] evaluated by pulmonologist Dr. Mitchell.  CT of the chest revealing scattered ground glass hazy nodular-type infiltrates probable left upper lobe infiltrate.  Study was not conclusive to rule out PE and VQ scan was performed.  Results not available to me, patient states negative for PE. Patient was treated for pneumonia.  Video imaging of the oropharynx was discussed patient declined.  \par \par Patient was discharged on oral antibiotic therapy.  He stopped smoking and vaping since discharge.  He restarted the Plavix no further hemoptysis  Drug-eluting stent LAD was placed December 2013.   Discussed risk and options of long term Plavix and he wishes to continue.\par \par Echocardiogram March 2022 LVEF 60%, posterior leaflet mitral valve prolapse moderate MR, mild TR, normal RVSP\par \par Exercise Myoview stress test June 2021, LVEF 57%, normal perfusion, diaphragm attenuation seen, nonischemic submaximal EKG response, hypertensive blood pressure response, no chest pain, baseline sinus rhythm\par \par Echocardiogram March 2021, LVEF 60%, severe eccentric MR, mild TR, normal RVSP, ascending aorta 4.3 cm\par \par 2-D echo January 2018 LV of 60%, normal diastolic function, normal Doppler, aortic root 4.5cm,  ascending aorta 4.2cm. \par \par Carotid and abdominal ultrasound January 2018 unobstructive, normal abdominal aortic size.\par \par Stress echo March 2016 LVEF 60% normal in size wall motion no ischemia, equivocal EKG response, no anginal symptoms, hypertensive blood pressures response, 80% MPHR 9 minutes Huseyin protocol\par \par Echocardiogram 3/17/16. EF: 55%, mild LAE and LV E. No LVH. Normal systolic function. Mild diastolic dysfunction. Mildly dilated aortic root. Aortic arch measures 2.8 cm, ascending aorta measures 4.3 cm. Trace to mild valvulopathy.\par \par Carotid ultrasound 3/17/16. Bilateral intimal thickening. No change noted.\par \par Abdominal ultrasound 3/17/16. No abdominal aortic aneurysm noted. No prominent or mobile plaque noted. Stress echocardiogram 3/24/16. Completed nine minutes and nine seconds of Huseyin protocol achieving 88% of MPHR. Peak blood pressure was 194/100 mmHg off of his Toprol. Equivocal for ischemia by EKG. Normal augmentation of left ventricular function wall motion. Negative for exercise-induced myocardial ischemia.\par

## 2023-04-26 NOTE — PHYSICAL EXAM
[Well Developed] : well developed [Well Nourished] : well nourished [No Acute Distress] : no acute distress [Normal Conjunctiva] : normal conjunctiva [Normal Venous Pressure] : normal venous pressure [No Carotid Bruit] : no carotid bruit [Normal S1, S2] : normal S1, S2 [No Rub] : no rub [No Gallop] : no gallop [Clear Lung Fields] : clear lung fields [Good Air Entry] : good air entry [No Respiratory Distress] : no respiratory distress  [Soft] : abdomen soft [Non Tender] : non-tender [No Masses/organomegaly] : no masses/organomegaly [Normal Bowel Sounds] : normal bowel sounds [Normal Gait] : normal gait [No Edema] : no edema [No Cyanosis] : no cyanosis [No Clubbing] : no clubbing [No Varicosities] : no varicosities [No Rash] : no rash [No Skin Lesions] : no skin lesions [Moves all extremities] : moves all extremities [No Focal Deficits] : no focal deficits [Normal Speech] : normal speech [Alert and Oriented] : alert and oriented [Normal memory] : normal memory [General Appearance - Well Developed] : well developed [Normal Appearance] : normal appearance [Well Groomed] : well groomed [General Appearance - Well Nourished] : well nourished [No Deformities] : no deformities [General Appearance - In No Acute Distress] : no acute distress [Normal Jugular Venous A Waves Present] : normal jugular venous A waves present [Normal Jugular Venous V Waves Present] : normal jugular venous V waves present [No Jugular Venous Downing A Waves] : no jugular venous downing A waves [Respiration, Rhythm And Depth] : normal respiratory rhythm and effort [Exaggerated Use Of Accessory Muscles For Inspiration] : no accessory muscle use [Auscultation Breath Sounds / Voice Sounds] : lungs were clear to auscultation bilaterally [Abdomen Soft] : soft [Abdomen Tenderness] : non-tender [Abdomen Mass (___ Cm)] : no abdominal mass palpated [Abnormal Walk] : normal gait [Gait - Sufficient For Exercise Testing] : the gait was sufficient for exercise testing [Nail Clubbing] : no clubbing of the fingernails [Cyanosis, Localized] : no localized cyanosis [Petechial Hemorrhages (___cm)] : no petechial hemorrhages [Skin Color & Pigmentation] : normal skin color and pigmentation [] : no rash [No Venous Stasis] : no venous stasis [Skin Lesions] : no skin lesions [No Skin Ulcers] : no skin ulcer [No Xanthoma] : no  xanthoma was observed [Oriented To Time, Place, And Person] : oriented to person, place, and time [Affect] : the affect was normal [Mood] : the mood was normal [No Anxiety] : not feeling anxious [de-identified] : 2/6 KRISTY MR

## 2023-05-01 ENCOUNTER — TRANSCRIPTION ENCOUNTER (OUTPATIENT)
Age: 63
End: 2023-05-01

## 2023-05-02 ENCOUNTER — APPOINTMENT (OUTPATIENT)
Dept: CARDIOLOGY | Facility: CLINIC | Age: 63
End: 2023-05-02
Payer: COMMERCIAL

## 2023-05-02 VITALS
OXYGEN SATURATION: 94 % | HEIGHT: 73 IN | HEART RATE: 62 BPM | WEIGHT: 243 LBS | SYSTOLIC BLOOD PRESSURE: 150 MMHG | DIASTOLIC BLOOD PRESSURE: 80 MMHG | BODY MASS INDEX: 32.2 KG/M2

## 2023-05-02 DIAGNOSIS — I34.0 NONRHEUMATIC MITRAL (VALVE) INSUFFICIENCY: ICD-10-CM

## 2023-05-02 DIAGNOSIS — E66.3 OVERWEIGHT: ICD-10-CM

## 2023-05-02 DIAGNOSIS — I71.20 THORACIC AORTIC ANEURYSM, WITHOUT RUPTURE, UNSPECIFIED: ICD-10-CM

## 2023-05-02 PROCEDURE — 93306 TTE W/DOPPLER COMPLETE: CPT

## 2023-05-02 PROCEDURE — 93880 EXTRACRANIAL BILAT STUDY: CPT

## 2023-05-02 PROCEDURE — 99214 OFFICE O/P EST MOD 30 MIN: CPT

## 2023-05-02 PROCEDURE — 93979 VASCULAR STUDY: CPT

## 2023-05-02 NOTE — DISCUSSION/SUMMARY
[FreeTextEntry1] : Terrence is a 63 year-old male  with medical history detailed above and active medical issues including:\par \par - Chronic stable angina, normal Myoview perfusion with normal LVEF June 2021.\par \par - Posterior mitral valve leaflet prolapse with moderate eccentric MR, normal LVEF echo March 2022.   \par \par - Coronary artery disease, unstable angina, non-Q MI, drug-eluting stent of the LAD in December 2013, stable on aspirin, Plavix, and Toprol, normalized LV systolic function and wall motion. Patient wishes to continue long-term aspirin and Plavix therapy. \par \par - Ascending aortic aneurysm 4.3cm unchanged,  echo March 2021. Patient reminded to avoid heavy weight lifting. \par \par - History of brief hemoptysis resolved, prior smoker, declines pulmonology follow-up, continue annual chest x-ray\par \par - Labile hypertension average resting home BPs at guideline goal on Toprol\par \par - Dyslipidemia, on high intensity statin therapy, stable.\par \par - Tobacco addiction, quit in December 2013, and again 1/9/16 after hemoptysis admission [facility].\par \par Advised patient to follow active lifestyle with regular cardiovascular exercise. Patient educated on lifestyle and diet modification with low sodium low fat diet and avoidance of excessive alcohol. Patient is aware to call with any symptoms or concerns. \par \par Echocardiogram ordered to evaluate for structural heart disease, carotid and abdominal ultrasound to evaluate for PAD in 6 months.  Cardiology follow-up 6 months.  Current cardiac medications remain unchanged and renewals  are up to date. Repeat labs will be ordered with PMD.\par \par Umer will follow up with Dr. Katie Barriga for primary care.\par \par Today's results of echocardiogram, carotid ultrasound, abdominal ultrasound reviewed and discussed with the patient.  No significant changes.  Continue present medications.  Cardiac follow-up 6 months and as needed.\par \par

## 2023-05-02 NOTE — REASON FOR VISIT
[Other: ____] : [unfilled] [FreeTextEntry1] : Terrence is a 63-year-old male with a history of dyslipidemia, hypertension,  angina, NSTEMI,  RENETTA LAD Dec 2013, mild cardiomyopathy with normalization of LVEF 60% echo March 2021, prior tobacco, quit in December 2013 and Jan 2016, moderate MR normal LVEF echo March 2022\par \par Cardiovascular review of symptoms is negative for exertional chest pain, dyspnea, palpitations, dizziness or syncope. No PND or orthopnea leg edema. No bleeding or black stool.\par \par Patient is physically active walking more than 20 minutes without exertional chest pain.  Patient is clamming in the chest deep water wearing a wet suit several hours a day during the winter. Patient's brother had sudden death with prior DVT after horse kicked his leg.\par \par Patient's been under recent emotional stress father passed away Feb 2022 and brother-in-law with Covid on ventilator March 2022.  ER evaluation [facility] 2/27/2022 for "feeling weird" normal evaluation discharged for outpatient cardiology follow-up.\par \par Patient returning to work as a  at [employer] starting April 2022. \par \par Patient had admission to [facility] January 9, 2016 for coughing and hemoptysis.  Patient states that he had been violently coughing productive yellow sputum and subsequent small amount of blood.  He became quite anxious that he may have "lung cancer".  He was admitted to [facility] evaluated by pulmonologist Dr. Mitchell.  CT of the chest revealing scattered ground glass hazy nodular-type infiltrates probable left upper lobe infiltrate.  Study was not conclusive to rule out PE and VQ scan was performed.  Results not available to me, patient states negative for PE. Patient was treated for pneumonia.  Video imaging of the oropharynx was discussed patient declined.  \par \par Patient was discharged on oral antibiotic therapy.  He stopped smoking and vaping since discharge.  He restarted the Plavix no further hemoptysis  Drug-eluting stent LAD was placed December 2013.   Discussed risk and options of long term Plavix and he wishes to continue.\par \par Echocardiogram March 2022 LVEF 60%, posterior leaflet mitral valve prolapse moderate MR, mild TR, normal RVSP\par \par Exercise Myoview stress test June 2021, LVEF 57%, normal perfusion, diaphragm attenuation seen, nonischemic submaximal EKG response, hypertensive blood pressure response, no chest pain, baseline sinus rhythm\par \par Echocardiogram March 2021, LVEF 60%, severe eccentric MR, mild TR, normal RVSP, ascending aorta 4.3 cm\par \par 2-D echo January 2018 LV of 60%, normal diastolic function, normal Doppler, aortic root 4.5cm,  ascending aorta 4.2cm. \par \par Carotid and abdominal ultrasound January 2018 unobstructive, normal abdominal aortic size.\par \par Stress echo March 2016 LVEF 60% normal in size wall motion no ischemia, equivocal EKG response, no anginal symptoms, hypertensive blood pressures response, 80% MPHR 9 minutes Huseyin protocol\par \par Echocardiogram 3/17/16. EF: 55%, mild LAE and LV E. No LVH. Normal systolic function. Mild diastolic dysfunction. Mildly dilated aortic root. Aortic arch measures 2.8 cm, ascending aorta measures 4.3 cm. Trace to mild valvulopathy.\par \par Carotid ultrasound 3/17/16. Bilateral intimal thickening. No change noted.\par \par Abdominal ultrasound 3/17/16. No abdominal aortic aneurysm noted. No prominent or mobile plaque noted. Stress echocardiogram 3/24/16. Completed nine minutes and nine seconds of Huseyin protocol achieving 88% of MPHR. Peak blood pressure was 194/100 mmHg off of his Toprol. Equivocal for ischemia by EKG. Normal augmentation of left ventricular function wall motion. Negative for exercise-induced myocardial ischemia.\par

## 2023-05-02 NOTE — PHYSICAL EXAM
[Well Developed] : well developed [Well Nourished] : well nourished [No Acute Distress] : no acute distress [Normal Conjunctiva] : normal conjunctiva [Normal Venous Pressure] : normal venous pressure [No Carotid Bruit] : no carotid bruit [Normal S1, S2] : normal S1, S2 [No Rub] : no rub [No Gallop] : no gallop [Clear Lung Fields] : clear lung fields [Good Air Entry] : good air entry [No Respiratory Distress] : no respiratory distress  [Soft] : abdomen soft [Non Tender] : non-tender [No Masses/organomegaly] : no masses/organomegaly [Normal Bowel Sounds] : normal bowel sounds [Normal Gait] : normal gait [No Edema] : no edema [No Cyanosis] : no cyanosis [No Clubbing] : no clubbing [No Varicosities] : no varicosities [No Rash] : no rash [No Skin Lesions] : no skin lesions [Moves all extremities] : moves all extremities [No Focal Deficits] : no focal deficits [Normal Speech] : normal speech [Alert and Oriented] : alert and oriented [Normal memory] : normal memory [de-identified] : 2/6 KRISTY MR [General Appearance - Well Developed] : well developed [Normal Appearance] : normal appearance [Well Groomed] : well groomed [General Appearance - Well Nourished] : well nourished [No Deformities] : no deformities [General Appearance - In No Acute Distress] : no acute distress [Normal Jugular Venous A Waves Present] : normal jugular venous A waves present [Normal Jugular Venous V Waves Present] : normal jugular venous V waves present [No Jugular Venous Downing A Waves] : no jugular venous downing A waves [Respiration, Rhythm And Depth] : normal respiratory rhythm and effort [Exaggerated Use Of Accessory Muscles For Inspiration] : no accessory muscle use [Auscultation Breath Sounds / Voice Sounds] : lungs were clear to auscultation bilaterally [Abdomen Soft] : soft [Abdomen Tenderness] : non-tender [Abdomen Mass (___ Cm)] : no abdominal mass palpated [Abnormal Walk] : normal gait [Gait - Sufficient For Exercise Testing] : the gait was sufficient for exercise testing [Nail Clubbing] : no clubbing of the fingernails [Cyanosis, Localized] : no localized cyanosis [Petechial Hemorrhages (___cm)] : no petechial hemorrhages [Skin Color & Pigmentation] : normal skin color and pigmentation [] : no rash [No Venous Stasis] : no venous stasis [Skin Lesions] : no skin lesions [No Skin Ulcers] : no skin ulcer [No Xanthoma] : no  xanthoma was observed [Oriented To Time, Place, And Person] : oriented to person, place, and time [Affect] : the affect was normal [Mood] : the mood was normal [No Anxiety] : not feeling anxious

## 2023-05-05 ENCOUNTER — NON-APPOINTMENT (OUTPATIENT)
Age: 63
End: 2023-05-05

## 2023-07-28 ENCOUNTER — RX RENEWAL (OUTPATIENT)
Age: 63
End: 2023-07-28

## 2023-10-31 ENCOUNTER — RX RENEWAL (OUTPATIENT)
Age: 63
End: 2023-10-31

## 2023-11-14 ENCOUNTER — APPOINTMENT (OUTPATIENT)
Dept: CARDIOLOGY | Facility: CLINIC | Age: 63
End: 2023-11-14
Payer: COMMERCIAL

## 2023-11-14 VITALS
HEIGHT: 73 IN | DIASTOLIC BLOOD PRESSURE: 70 MMHG | HEART RATE: 70 BPM | BODY MASS INDEX: 31.01 KG/M2 | SYSTOLIC BLOOD PRESSURE: 148 MMHG | WEIGHT: 234 LBS | OXYGEN SATURATION: 95 %

## 2023-11-14 PROCEDURE — 99214 OFFICE O/P EST MOD 30 MIN: CPT

## 2023-11-14 PROCEDURE — 93306 TTE W/DOPPLER COMPLETE: CPT

## 2023-12-04 ENCOUNTER — RX RENEWAL (OUTPATIENT)
Age: 63
End: 2023-12-04

## 2023-12-04 RX ORDER — FOLIC ACID 1 MG/1
1 TABLET ORAL
Qty: 90 | Refills: 3 | Status: ACTIVE | COMMUNITY
Start: 2018-06-26 | End: 1900-01-01

## 2024-02-06 ENCOUNTER — OFFICE (OUTPATIENT)
Dept: URBAN - METROPOLITAN AREA CLINIC 8 | Facility: CLINIC | Age: 64
Setting detail: OPHTHALMOLOGY
End: 2024-02-06
Payer: COMMERCIAL

## 2024-02-06 DIAGNOSIS — H25.13: ICD-10-CM

## 2024-02-06 DIAGNOSIS — H52.4: ICD-10-CM

## 2024-02-06 PROCEDURE — 92015 DETERMINE REFRACTIVE STATE: CPT

## 2024-02-06 PROCEDURE — 92004 COMPRE OPH EXAM NEW PT 1/>: CPT

## 2024-02-06 ASSESSMENT — REFRACTION_AUTOREFRACTION
OD_SPHERE: +1.75
OS_SPHERE: +2.50
OD_CYLINDER: -0.50
OS_AXIS: 079
OD_AXIS: 114
OS_CYLINDER: -1.00

## 2024-02-06 ASSESSMENT — REFRACTION_MANIFEST
OU_VA: 20/20
OD_VA1: 20/20-
OU_VA: 20/20
OD_CYLINDER: -0.25
OS_ADD: +2.25
OD_CYLINDER: -0.50
OD_AXIS: 115
OS_SPHERE: +2.25
OS_CYLINDER: -0.50
OS_SPHERE: +1.50
OD_AXIS: 115
OS_AXIS: 080
OD_SPHERE: +1.50
OS_AXIS: 080
OD_SPHERE: +1.75
OD_VA1: 20/20-
OD_ADD: +2.25
OS_CYLINDER: -0.75
OS_VA1: 20/20
OS_VA1: 20/20

## 2024-02-06 ASSESSMENT — REFRACTION_CURRENTRX
OD_SPHERE: +0.50
OS_ADD: +2.00
OS_CYLINDER: -0.50
OD_AXIS: 105
OD_OVR_VA: 20/
OS_OVR_VA: 20/
OS_AXIS: 080
OD_CYLINDER: -0.50
OD_ADD: +2.00
OS_SPHERE: +1.00

## 2024-02-06 ASSESSMENT — SPHEQUIV_DERIVED
OD_SPHEQUIV: 1.5
OD_SPHEQUIV: 1.375
OD_SPHEQUIV: 1.5
OS_SPHEQUIV: 1.875
OS_SPHEQUIV: 2
OS_SPHEQUIV: 1.25

## 2024-02-06 ASSESSMENT — CONFRONTATIONAL VISUAL FIELD TEST (CVF)
OD_FINDINGS: FULL
OS_FINDINGS: FULL

## 2024-02-18 ENCOUNTER — RX RENEWAL (OUTPATIENT)
Age: 64
End: 2024-02-18

## 2024-04-09 ENCOUNTER — APPOINTMENT (OUTPATIENT)
Dept: CARDIOLOGY | Facility: CLINIC | Age: 64
End: 2024-04-09

## 2024-05-13 ENCOUNTER — RX RENEWAL (OUTPATIENT)
Age: 64
End: 2024-05-13

## 2024-05-29 PROBLEM — I34.0 MODERATE TO SEVERE MITRAL REGURGITATION: Status: ACTIVE | Noted: 2023-05-02

## 2024-05-29 PROBLEM — Z98.61 CORONARY ANGIOPLASTY STATUS: Status: ACTIVE | Noted: 2017-10-30

## 2024-05-29 PROBLEM — Z87.891 HISTORY OF NICOTINE DEPENDENCE: Status: ACTIVE | Noted: 2017-10-30

## 2024-05-29 PROBLEM — E78.2 MIXED HYPERLIPIDEMIA: Status: ACTIVE | Noted: 2017-10-30

## 2024-05-29 PROBLEM — I21.4 NON-STEMI (NON-ST ELEVATED MYOCARDIAL INFARCTION): Status: ACTIVE | Noted: 2017-10-30

## 2024-05-29 PROBLEM — E78.41 ELEVATED LIPOPROTEIN A LEVEL: Status: ACTIVE | Noted: 2021-01-12

## 2024-05-29 PROBLEM — R06.09 DOE (DYSPNEA ON EXERTION): Status: ACTIVE | Noted: 2021-04-27

## 2024-05-29 PROBLEM — R04.2 HEMOPTYSIS: Status: ACTIVE | Noted: 2017-10-30

## 2024-05-30 ENCOUNTER — APPOINTMENT (OUTPATIENT)
Dept: CARDIOLOGY | Facility: CLINIC | Age: 64
End: 2024-05-30
Payer: COMMERCIAL

## 2024-05-30 VITALS
HEART RATE: 77 BPM | WEIGHT: 234 LBS | HEIGHT: 73 IN | DIASTOLIC BLOOD PRESSURE: 70 MMHG | BODY MASS INDEX: 31.01 KG/M2 | SYSTOLIC BLOOD PRESSURE: 118 MMHG | OXYGEN SATURATION: 94 %

## 2024-05-30 DIAGNOSIS — E78.2 MIXED HYPERLIPIDEMIA: ICD-10-CM

## 2024-05-30 DIAGNOSIS — R04.2 HEMOPTYSIS: ICD-10-CM

## 2024-05-30 DIAGNOSIS — Z98.61 CORONARY ANGIOPLASTY STATUS: ICD-10-CM

## 2024-05-30 DIAGNOSIS — I34.0 NONRHEUMATIC MITRAL (VALVE) INSUFFICIENCY: ICD-10-CM

## 2024-05-30 DIAGNOSIS — E78.41 ELEVATED LIPOPROTEIN(A): ICD-10-CM

## 2024-05-30 DIAGNOSIS — Z87.891 PERSONAL HISTORY OF NICOTINE DEPENDENCE: ICD-10-CM

## 2024-05-30 DIAGNOSIS — I21.4 NON-ST ELEVATION (NSTEMI) MYOCARDIAL INFARCTION: ICD-10-CM

## 2024-05-30 DIAGNOSIS — R06.09 OTHER FORMS OF DYSPNEA: ICD-10-CM

## 2024-05-30 PROCEDURE — 93306 TTE W/DOPPLER COMPLETE: CPT

## 2024-05-30 PROCEDURE — G2211 COMPLEX E/M VISIT ADD ON: CPT

## 2024-05-30 PROCEDURE — 99215 OFFICE O/P EST HI 40 MIN: CPT

## 2024-05-30 RX ORDER — CLOPIDOGREL BISULFATE 75 MG/1
75 TABLET, FILM COATED ORAL
Qty: 90 | Refills: 3 | Status: ACTIVE | COMMUNITY
Start: 2019-04-30 | End: 1900-01-01

## 2024-05-30 RX ORDER — ATORVASTATIN CALCIUM 80 MG/1
80 TABLET, FILM COATED ORAL
Qty: 90 | Refills: 3 | Status: ACTIVE | COMMUNITY
Start: 2021-09-07 | End: 1900-01-01

## 2024-05-30 RX ORDER — METOPROLOL SUCCINATE 25 MG/1
25 TABLET, EXTENDED RELEASE ORAL
Qty: 45 | Refills: 3 | Status: ACTIVE | COMMUNITY
Start: 2019-02-25 | End: 1900-01-01

## 2024-05-30 NOTE — DISCUSSION/SUMMARY
[FreeTextEntry1] : Patient has medical history detailed above and active medical issues including:  - Chronic stable angina, normal Myoview perfusion with normal LVEF June 2021.  - Asymptomatic Bileaflet leaflet mitral valve prolapse with moderate- severe eccentric MR, normal LVEF echo May 2024.  - Coronary artery disease, unstable angina, non-Q MI, drug-eluting stent of the LAD in December 2013, stable on aspirin, Plavix, and Toprol, normalized LV systolic function and wall motion. Patient wishes to continue long-term aspirin and Plavix therapy.   - Ascending aortic aneurysm 4.6cm echo Nov 2023. Patient reminded to avoid heavy weight lifting.   - History of brief hemoptysis resolved, prior smoker, declines pulmonology follow-up, continue annual chest x-ray  - Labile hypertension average resting home BPs at guideline goal on Toprol  - Dyslipidemia, on high intensity statin therapy, stable.  - Tobacco addiction, quit in December 2013, and again 1/9/16 after hemoptysis admission [facility].  Advised patient to follow active lifestyle with regular cardiovascular exercise. Patient educated on lifestyle and diet modification with low sodium low fat diet and avoidance of excessive alcohol. Patient is aware to call with any symptoms or concerns.   Cardiology follow-up 6 months with limited echocardiogram.  Current cardiac medications remain unchanged and renewals  are up to date. Repeat labs will be ordered with PMALEJANDRA.  Umer will follow up with Dr. Katie Barriga for primary care.  Total time spent 45 minutes, reviewing of test results, chart information, patient discussion, physical exam and completion of chart documentation.

## 2024-05-30 NOTE — REASON FOR VISIT
[Other: ____] : [unfilled] [FreeTextEntry1] : Terrence is a 64-year-old male with a history of dyslipidemia, hypertension,  angina, NSTEMI,  RENETTA LAD Dec 2013, mild cardiomyopathy with normalization of LVEF 60% echo March 2021, prior tobacco, quit in December 2013 and Jan 2016, moderate-severe MR normal LVEF echo May 2024  Cardiovascular review of symptoms is negative for exertional chest pain, dyspnea, palpitations, dizziness or syncope. No PND or orthopnea leg edema. No bleeding or black stool.  Patient is physically active walking more than 20 minutes without exertional chest pain.  Patient is clamming in the chest deep water wearing a wet suit several hours a day during the winter. Patient's brother had sudden death with prior DVT after horse kicked his leg.  Patient's been under recent emotional stress father passed away Feb 2022.  ER evaluation [facility] 2/27/2022 for "feeling weird" normal evaluation discharged for outpatient cardiology follow-up.  Patient returning to work as a  at [employer] starting April 2022.   Patient had admission to [facility] January 9, 2016 for coughing and hemoptysis.  Patient states that he had been violently coughing productive yellow sputum and subsequent small amount of blood.  He became quite anxious that he may have "lung cancer".  He was admitted to [facility] evaluated by pulmonologist Dr. Mitchell.  CT of the chest revealing scattered ground glass hazy nodular-type infiltrates probable left upper lobe infiltrate.  Study was not conclusive to rule out PE and VQ scan was performed.  Results not available to me, patient states negative for PE. Patient was treated for pneumonia.  Video imaging of the oropharynx was discussed patient declined.    Patient was discharged on oral antibiotic therapy.  He stopped smoking and vaping since discharge.  He restarted the Plavix no further hemoptysis  Drug-eluting stent LAD was placed December 2013.   Discussed risk and options of long term Plavix and he wishes to continue.  Echocardiogram May 2024 LVEF 65%, posterior mitral valve leaflet prolapse with moderate-severe MR, normal RVSP.  Echocardiogram Nov 2023 LVEF 68%, bileaflet mitral valve prolapse with moderate MR, mild LAE, moderate PAH, aortic root 4.6 cm  Echocardiogram March 2022 LVEF 60%, posterior leaflet mitral valve prolapse moderate MR, mild TR, normal RVSP  Exercise Myoview stress test June 2021, LVEF 57%, normal perfusion, diaphragm attenuation seen, nonischemic submaximal EKG response, hypertensive blood pressure response, no chest pain, baseline sinus rhythm  Echocardiogram March 2021, LVEF 60%, severe eccentric MR, mild TR, normal RVSP, ascending aorta 4.3 cm  2-D echo January 2018 LV of 60%, normal diastolic function, normal Doppler, aortic root 4.5cm,  ascending aorta 4.2cm.   Carotid and abdominal ultrasound January 2018 unobstructive, normal abdominal aortic size.  Stress echo March 2016 LVEF 60% normal in size wall motion no ischemia, equivocal EKG response, no anginal symptoms, hypertensive blood pressures response, 80% MPHR 9 minutes Huseyin protocol  Echocardiogram 3/17/16. EF: 55%, mild LAE and LV E. No LVH. Normal systolic function. Mild diastolic dysfunction. Mildly dilated aortic root. Aortic arch measures 2.8 cm, ascending aorta measures 4.3 cm. Trace to mild valvulopathy.  Carotid ultrasound 3/17/16. Bilateral intimal thickening. No change noted.  Abdominal ultrasound 3/17/16. No abdominal aortic aneurysm noted. No prominent or mobile plaque noted. Stress echocardiogram 3/24/16. Completed nine minutes and nine seconds of Huseyin protocol achieving 88% of MPHR. Peak blood pressure was 194/100 mmHg off of his Toprol. Equivocal for ischemia by EKG. Normal augmentation of left ventricular function wall motion. Negative for exercise-induced myocardial ischemia.

## 2024-05-30 NOTE — PHYSICAL EXAM
[Well Developed] : well developed [Well Nourished] : well nourished [No Acute Distress] : no acute distress [Normal Conjunctiva] : normal conjunctiva [Normal Venous Pressure] : normal venous pressure [No Carotid Bruit] : no carotid bruit [Normal S1, S2] : normal S1, S2 [No Rub] : no rub [No Gallop] : no gallop [Clear Lung Fields] : clear lung fields [Good Air Entry] : good air entry [No Respiratory Distress] : no respiratory distress  [Soft] : abdomen soft [Non Tender] : non-tender [No Masses/organomegaly] : no masses/organomegaly [Normal Bowel Sounds] : normal bowel sounds [Normal Gait] : normal gait [No Edema] : no edema [No Cyanosis] : no cyanosis [No Clubbing] : no clubbing [No Varicosities] : no varicosities [No Rash] : no rash [No Skin Lesions] : no skin lesions [Moves all extremities] : moves all extremities [No Focal Deficits] : no focal deficits [Normal Speech] : normal speech [Alert and Oriented] : alert and oriented [Normal memory] : normal memory [General Appearance - Well Developed] : well developed [Normal Appearance] : normal appearance [Well Groomed] : well groomed [General Appearance - Well Nourished] : well nourished [No Deformities] : no deformities [General Appearance - In No Acute Distress] : no acute distress [Normal Jugular Venous A Waves Present] : normal jugular venous A waves present [Normal Jugular Venous V Waves Present] : normal jugular venous V waves present [No Jugular Venous Downing A Waves] : no jugular venous downing A waves [Respiration, Rhythm And Depth] : normal respiratory rhythm and effort [Exaggerated Use Of Accessory Muscles For Inspiration] : no accessory muscle use [Auscultation Breath Sounds / Voice Sounds] : lungs were clear to auscultation bilaterally [Abdomen Soft] : soft [Abdomen Tenderness] : non-tender [Abdomen Mass (___ Cm)] : no abdominal mass palpated [Abnormal Walk] : normal gait [Gait - Sufficient For Exercise Testing] : the gait was sufficient for exercise testing [Nail Clubbing] : no clubbing of the fingernails [Cyanosis, Localized] : no localized cyanosis [Petechial Hemorrhages (___cm)] : no petechial hemorrhages [Skin Color & Pigmentation] : normal skin color and pigmentation [] : no rash [No Venous Stasis] : no venous stasis [Skin Lesions] : no skin lesions [No Skin Ulcers] : no skin ulcer [No Xanthoma] : no  xanthoma was observed [Oriented To Time, Place, And Person] : oriented to person, place, and time [Affect] : the affect was normal [Mood] : the mood was normal [No Anxiety] : not feeling anxious [de-identified] : 2/6 KRISTY MR

## 2024-10-05 ENCOUNTER — RX RENEWAL (OUTPATIENT)
Age: 64
End: 2024-10-05

## 2024-10-19 NOTE — DISCUSSION/SUMMARY
Pt in NAD at time of DC. Exited with a steady gait   [FreeTextEntry1] : Terrence is a 62 year-old male  with medical history detailed above and active medical issues including:\par \par - Chronic stable angina, normal Myoview perfusion with normal LVEF June 2021.\par \par - Posterior mitral valve leaflet prolapse with moderate eccentric MR, normal LVEF echo March 2022.   \par \par - Coronary artery disease, unstable angina, non-Q MI, drug-eluting stent of the LAD in December 2013, stable on aspirin, Plavix, and Toprol, normalized LV systolic function and wall motion. Patient wishes to continue long-term aspirin and Plavix therapy. \par \par - Ascending aortic aneurysm 4.3cm unchanged,  echo March 2021. Patient reminded to avoid heavy weight lifting. \par \par - History of brief hemoptysis resolved, prior smoker, declines pulmonology follow-up, continue annual chest x-ray\par \par - Labile hypertension average resting home BPs at guideline goal on Toprol\par \par - Dyslipidemia, on high intensity statin therapy, stable.\par \par - Tobacco addiction, quit in December 2013, and again 1/9/16 after hemoptysis admission [facility].\par \par Advised patient to follow active lifestyle with regular cardiovascular exercise. Patient educated on lifestyle and diet modification with low sodium low fat diet and avoidance of excessive alcohol. Patient is aware to call with any symptoms or concerns. \par \par Echocardiogram ordered to evaluate for structural heart disease, carotid and abdominal ultrasound to evaluate for PAD in 6 months.  Cardiology follow-up 6 months.  Current cardiac medications remain unchanged and renewals  are up to date. Repeat labs will be ordered with PMD.\par \par Umer will follow up with Dr. Katie Barriga for primary care.\par \par \par

## 2024-12-02 ENCOUNTER — RX RENEWAL (OUTPATIENT)
Age: 64
End: 2024-12-02

## 2024-12-04 ENCOUNTER — APPOINTMENT (OUTPATIENT)
Dept: CARDIOLOGY | Facility: CLINIC | Age: 64
End: 2024-12-04
Payer: COMMERCIAL

## 2024-12-04 PROCEDURE — 93306 TTE W/DOPPLER COMPLETE: CPT

## 2025-01-02 DIAGNOSIS — R06.09 OTHER FORMS OF DYSPNEA: ICD-10-CM

## 2025-01-02 DIAGNOSIS — E78.41 ELEVATED LIPOPROTEIN(A): ICD-10-CM

## 2025-01-02 DIAGNOSIS — Z98.61 CORONARY ANGIOPLASTY STATUS: ICD-10-CM

## 2025-01-08 ENCOUNTER — APPOINTMENT (OUTPATIENT)
Dept: CARDIOLOGY | Facility: CLINIC | Age: 65
End: 2025-01-08
Payer: COMMERCIAL

## 2025-01-08 VITALS
SYSTOLIC BLOOD PRESSURE: 136 MMHG | OXYGEN SATURATION: 96 % | BODY MASS INDEX: 31 KG/M2 | WEIGHT: 235 LBS | HEART RATE: 85 BPM | DIASTOLIC BLOOD PRESSURE: 70 MMHG

## 2025-01-08 DIAGNOSIS — E78.2 MIXED HYPERLIPIDEMIA: ICD-10-CM

## 2025-01-08 PROCEDURE — 93000 ELECTROCARDIOGRAM COMPLETE: CPT

## 2025-01-08 PROCEDURE — 99214 OFFICE O/P EST MOD 30 MIN: CPT

## 2025-02-04 ENCOUNTER — RX RENEWAL (OUTPATIENT)
Age: 65
End: 2025-02-04

## 2025-03-03 ENCOUNTER — RX RENEWAL (OUTPATIENT)
Age: 65
End: 2025-03-03

## 2025-03-03 DIAGNOSIS — E78.2 MIXED HYPERLIPIDEMIA: ICD-10-CM

## 2025-03-03 DIAGNOSIS — I34.0 NONRHEUMATIC MITRAL (VALVE) INSUFFICIENCY: ICD-10-CM

## 2025-03-13 ENCOUNTER — TRANSCRIPTION ENCOUNTER (OUTPATIENT)
Age: 65
End: 2025-03-13

## 2025-03-13 DIAGNOSIS — Z98.61 CORONARY ANGIOPLASTY STATUS: ICD-10-CM

## 2025-03-13 DIAGNOSIS — R06.09 OTHER FORMS OF DYSPNEA: ICD-10-CM

## 2025-07-14 ENCOUNTER — NON-APPOINTMENT (OUTPATIENT)
Age: 65
End: 2025-07-14

## 2025-07-15 ENCOUNTER — APPOINTMENT (OUTPATIENT)
Dept: CARDIOLOGY | Facility: CLINIC | Age: 65
End: 2025-07-15
Payer: COMMERCIAL

## 2025-07-15 VITALS
HEART RATE: 73 BPM | WEIGHT: 228 LBS | BODY MASS INDEX: 30.22 KG/M2 | DIASTOLIC BLOOD PRESSURE: 80 MMHG | OXYGEN SATURATION: 96 % | SYSTOLIC BLOOD PRESSURE: 128 MMHG | HEIGHT: 73 IN

## 2025-07-15 PROCEDURE — 93306 TTE W/DOPPLER COMPLETE: CPT

## 2025-07-15 PROCEDURE — 99215 OFFICE O/P EST HI 40 MIN: CPT

## 2025-07-15 RX ORDER — LOSARTAN POTASSIUM 25 MG/1
25 TABLET, FILM COATED ORAL DAILY
Qty: 90 | Refills: 0 | Status: ACTIVE | COMMUNITY
Start: 2025-07-15 | End: 1900-01-01

## 2025-07-31 ENCOUNTER — NON-APPOINTMENT (OUTPATIENT)
Age: 65
End: 2025-07-31